# Patient Record
Sex: FEMALE | Race: WHITE | Employment: UNEMPLOYED | URBAN - METROPOLITAN AREA
[De-identification: names, ages, dates, MRNs, and addresses within clinical notes are randomized per-mention and may not be internally consistent; named-entity substitution may affect disease eponyms.]

---

## 2017-01-28 ENCOUNTER — OFFICE VISIT (OUTPATIENT)
Dept: FAMILY MEDICINE CLINIC | Facility: CLINIC | Age: 42
End: 2017-01-28

## 2017-01-28 VITALS
DIASTOLIC BLOOD PRESSURE: 82 MMHG | WEIGHT: 293 LBS | BODY MASS INDEX: 61 KG/M2 | TEMPERATURE: 98 F | SYSTOLIC BLOOD PRESSURE: 126 MMHG | HEART RATE: 76 BPM

## 2017-01-28 DIAGNOSIS — J31.0 RHINITIS, UNSPECIFIED TYPE: Primary | ICD-10-CM

## 2017-01-28 PROCEDURE — 99213 OFFICE O/P EST LOW 20 MIN: CPT | Performed by: FAMILY MEDICINE

## 2017-01-28 PROCEDURE — 99212 OFFICE O/P EST SF 10 MIN: CPT | Performed by: FAMILY MEDICINE

## 2017-01-28 NOTE — PROGRESS NOTES
HPI:    Patient ID: Elisa Shabazz is a 39year old female. Patient presents with:  Ear Problem  Sneezing  Runny Nose  Sore Throat    HPI  Pt's 4 yo son was just in earlier with pharyngitis. Pt decided to get seen, too, due to above sxs. Afebrile now.    Sx encounter    Imaging & Referrals:  None         ID#[id number]

## 2017-01-28 NOTE — PATIENT INSTRUCTIONS
Sudafed , over the counter but you need to go to the pharmacy counter    30 mg tablets , 2-3 x a day, don't take last dose near bed time. For runny, drippy nose.

## 2017-03-11 ENCOUNTER — OFFICE VISIT (OUTPATIENT)
Dept: FAMILY MEDICINE CLINIC | Facility: CLINIC | Age: 42
End: 2017-03-11

## 2017-03-11 VITALS
BODY MASS INDEX: 53.92 KG/M2 | RESPIRATION RATE: 18 BRPM | DIASTOLIC BLOOD PRESSURE: 89 MMHG | HEART RATE: 72 BPM | SYSTOLIC BLOOD PRESSURE: 162 MMHG | TEMPERATURE: 98 F | WEIGHT: 293 LBS | HEIGHT: 62 IN

## 2017-03-11 DIAGNOSIS — I10 ESSENTIAL HYPERTENSION: Primary | ICD-10-CM

## 2017-03-11 PROCEDURE — 99213 OFFICE O/P EST LOW 20 MIN: CPT | Performed by: FAMILY MEDICINE

## 2017-03-11 PROCEDURE — 99212 OFFICE O/P EST SF 10 MIN: CPT | Performed by: FAMILY MEDICINE

## 2017-03-11 RX ORDER — LISINOPRIL 10 MG/1
10 TABLET ORAL DAILY
Qty: 90 TABLET | Refills: 3 | Status: SHIPPED | OUTPATIENT
Start: 2017-03-11 | End: 2017-12-30

## 2017-03-11 NOTE — PROGRESS NOTES
HPI:    Patient ID: Jackeline Lozano is a 39year old female. HPI Comments: Patient is here for follow up for chronic medical issues- HTN. The patient has not been on medications recently.  There are no acute issues and patient is requesting refills on her

## 2017-03-29 ENCOUNTER — OFFICE VISIT (OUTPATIENT)
Dept: FAMILY MEDICINE CLINIC | Facility: CLINIC | Age: 42
End: 2017-03-29

## 2017-03-29 VITALS
DIASTOLIC BLOOD PRESSURE: 90 MMHG | BODY MASS INDEX: 60 KG/M2 | WEIGHT: 293 LBS | HEART RATE: 69 BPM | SYSTOLIC BLOOD PRESSURE: 137 MMHG | TEMPERATURE: 98 F

## 2017-03-29 DIAGNOSIS — K52.9 GASTROENTERITIS: Primary | ICD-10-CM

## 2017-03-29 PROCEDURE — 99213 OFFICE O/P EST LOW 20 MIN: CPT | Performed by: PHYSICIAN ASSISTANT

## 2017-03-29 PROCEDURE — 99212 OFFICE O/P EST SF 10 MIN: CPT | Performed by: PHYSICIAN ASSISTANT

## 2017-03-29 NOTE — PROGRESS NOTES
HPI:    Patient ID: Mike Aponte is a 39year old female. HPI Comments: Patient presents for abdominal pain, vomiting, and diarrhea for past four days. She states vomiting and diarrhea have resolved but still feels weak and tired today.   She had lower RTC if symptoms are persisting or present to ER with severe abdominal pain or if unable to tolerate PO. No orders of the defined types were placed in this encounter.        Meds This Visit:  No prescriptions requested or ordered in this encounter    Im

## 2017-12-19 ENCOUNTER — TELEPHONE (OUTPATIENT)
Dept: FAMILY MEDICINE CLINIC | Facility: CLINIC | Age: 42
End: 2017-12-19

## 2017-12-19 DIAGNOSIS — Z12.31 VISIT FOR SCREENING MAMMOGRAM: Primary | ICD-10-CM

## 2017-12-19 NOTE — TELEPHONE ENCOUNTER
See 16 mammogram order- will  before pt can be scheduled    Central Scheduling  requesting new order

## 2017-12-19 NOTE — TELEPHONE ENCOUNTER
Can contact pt and see if this is just routine screening or is there a lump or something that needs to be checked. Chart reviewed and not clear if any previous issue. Can make it routine screening mammogram if no hx of issues or current symptoms.

## 2017-12-19 NOTE — TELEPHONE ENCOUNTER
Contacted patient and informed order has been re entered, informed of Dr. Hilda Doran message below. Patient verbalized understanding, requested to be transferred to central scheduling to schedule annual px.

## 2017-12-19 NOTE — TELEPHONE ENCOUNTER
Melba/[facility] [phone] states that she has a question regarding a the new order. Does the doctor want a diagnostic or screening? Please, call Melba to further discuss.

## 2017-12-19 NOTE — TELEPHONE ENCOUNTER
Detailed voicemail left on Melba ERWIN informing order for patient has been placed. To call office with any questions.

## 2017-12-19 NOTE — TELEPHONE ENCOUNTER
Dr. Tracee Troy please see message below, mammogram order pended. Pleas review and make changes necessary prior to signing.

## 2017-12-20 NOTE — TELEPHONE ENCOUNTER
Called and spoke w/ Pt. She said new order was only needed if she was going to be scheduled  or after, since order would then be . Pt was able to get appointment for this  so she can still use order from last year.  Also it is just h

## 2017-12-30 ENCOUNTER — OFFICE VISIT (OUTPATIENT)
Dept: FAMILY MEDICINE CLINIC | Facility: CLINIC | Age: 42
End: 2017-12-30

## 2017-12-30 VITALS
WEIGHT: 293 LBS | DIASTOLIC BLOOD PRESSURE: 91 MMHG | SYSTOLIC BLOOD PRESSURE: 142 MMHG | TEMPERATURE: 98 F | HEIGHT: 62.5 IN | BODY MASS INDEX: 52.57 KG/M2 | HEART RATE: 73 BPM

## 2017-12-30 DIAGNOSIS — J06.9 ACUTE UPPER RESPIRATORY INFECTION: Primary | ICD-10-CM

## 2017-12-30 DIAGNOSIS — I10 ESSENTIAL HYPERTENSION: ICD-10-CM

## 2017-12-30 DIAGNOSIS — Z12.39 SCREENING FOR BREAST CANCER: ICD-10-CM

## 2017-12-30 PROBLEM — K52.9 GASTROENTERITIS: Status: RESOLVED | Noted: 2017-03-29 | Resolved: 2017-12-30

## 2017-12-30 PROCEDURE — 99212 OFFICE O/P EST SF 10 MIN: CPT | Performed by: PHYSICIAN ASSISTANT

## 2017-12-30 PROCEDURE — 99213 OFFICE O/P EST LOW 20 MIN: CPT | Performed by: PHYSICIAN ASSISTANT

## 2017-12-30 RX ORDER — LISINOPRIL 10 MG/1
10 TABLET ORAL DAILY
Qty: 90 TABLET | Refills: 3 | Status: SHIPPED | OUTPATIENT
Start: 2017-12-30 | End: 2018-02-07

## 2017-12-30 RX ORDER — BENZONATATE 100 MG/1
100 CAPSULE ORAL 3 TIMES DAILY PRN
Qty: 30 CAPSULE | Refills: 0 | Status: SHIPPED | OUTPATIENT
Start: 2017-12-30 | End: 2018-02-02 | Stop reason: ALTCHOICE

## 2017-12-30 RX ORDER — AZITHROMYCIN 250 MG/1
TABLET, FILM COATED ORAL
Qty: 6 TABLET | Refills: 0 | Status: SHIPPED | OUTPATIENT
Start: 2017-12-30 | End: 2018-02-02 | Stop reason: ALTCHOICE

## 2017-12-30 NOTE — PROGRESS NOTES
HPI:    Patient ID: Flakita Gustafson is a 43year old female. Patient presents for cough and chest congestion for past one week. She states has had symptoms on and off for past couple months and family keeps passing it around.   She denies fever or chill supple. Cardiovascular: Normal rate, regular rhythm and normal heart sounds. Pulmonary/Chest: Effort normal and breath sounds normal. No respiratory distress. She has no wheezes. She has no rales. Lymphadenopathy:     She has no cervical adenopathy.

## 2018-02-01 ENCOUNTER — NURSE TRIAGE (OUTPATIENT)
Dept: OTHER | Age: 43
End: 2018-02-01

## 2018-02-02 ENCOUNTER — OFFICE VISIT (OUTPATIENT)
Dept: FAMILY MEDICINE CLINIC | Facility: CLINIC | Age: 43
End: 2018-02-02

## 2018-02-02 VITALS
TEMPERATURE: 98 F | WEIGHT: 293 LBS | BODY MASS INDEX: 60 KG/M2 | SYSTOLIC BLOOD PRESSURE: 172 MMHG | HEART RATE: 76 BPM | DIASTOLIC BLOOD PRESSURE: 78 MMHG

## 2018-02-02 DIAGNOSIS — M79.642 HAND PAIN, LEFT: Primary | ICD-10-CM

## 2018-02-02 PROCEDURE — 99214 OFFICE O/P EST MOD 30 MIN: CPT | Performed by: FAMILY MEDICINE

## 2018-02-02 PROCEDURE — 97810 ACUP 1/> WO ESTIM 1ST 15 MIN: CPT | Performed by: FAMILY MEDICINE

## 2018-02-02 PROCEDURE — 99212 OFFICE O/P EST SF 10 MIN: CPT | Performed by: FAMILY MEDICINE

## 2018-02-02 NOTE — TELEPHONE ENCOUNTER
Action Requested: Summary for Provider     []  Critical Lab, Recommendations Needed  [] Need Additional Advice  []   FYI    []   Need Orders  [] Need Medications Sent to Pharmacy  []  Other     SUMMARY:Appointment made with Dr Brian Barraza on 2/2/18 at [phone] AM

## 2018-02-02 NOTE — PROGRESS NOTES
HPI:    Patient ID: Jay Villaseñor is a 43year old female. Started having pain in the thumb few days ago, hit her thumb really hard. Then next day went to work with  and started having pain in the left forearm and left hand.  Still hu

## 2018-02-06 ENCOUNTER — OFFICE VISIT (OUTPATIENT)
Dept: FAMILY MEDICINE CLINIC | Facility: CLINIC | Age: 43
End: 2018-02-06

## 2018-02-06 VITALS
WEIGHT: 293 LBS | HEART RATE: 69 BPM | TEMPERATURE: 98 F | SYSTOLIC BLOOD PRESSURE: 149 MMHG | DIASTOLIC BLOOD PRESSURE: 99 MMHG | BODY MASS INDEX: 60 KG/M2

## 2018-02-06 DIAGNOSIS — M79.642 HAND PAIN, LEFT: Primary | ICD-10-CM

## 2018-02-06 PROCEDURE — 99212 OFFICE O/P EST SF 10 MIN: CPT | Performed by: FAMILY MEDICINE

## 2018-02-06 PROCEDURE — 99213 OFFICE O/P EST LOW 20 MIN: CPT | Performed by: FAMILY MEDICINE

## 2018-02-06 NOTE — PROGRESS NOTES
HPI:    Patient ID: Sergio Mayers is a 43year old female. Patient doing much better . Almost no pain since she did acupuncture. Review of Systems   Constitutional: Negative. Respiratory: Negative. Cardiovascular: Negative.     Musculoskel

## 2018-02-07 ENCOUNTER — NURSE TRIAGE (OUTPATIENT)
Dept: OTHER | Age: 43
End: 2018-02-07

## 2018-02-07 ENCOUNTER — OFFICE VISIT (OUTPATIENT)
Dept: FAMILY MEDICINE CLINIC | Facility: CLINIC | Age: 43
End: 2018-02-07

## 2018-02-07 VITALS
HEART RATE: 77 BPM | TEMPERATURE: 98 F | DIASTOLIC BLOOD PRESSURE: 79 MMHG | HEIGHT: 62.5 IN | BODY MASS INDEX: 52.57 KG/M2 | SYSTOLIC BLOOD PRESSURE: 152 MMHG | WEIGHT: 293 LBS

## 2018-02-07 DIAGNOSIS — I10 ESSENTIAL HYPERTENSION: Primary | ICD-10-CM

## 2018-02-07 DIAGNOSIS — F41.9 ANXIETY: ICD-10-CM

## 2018-02-07 PROCEDURE — 99212 OFFICE O/P EST SF 10 MIN: CPT | Performed by: FAMILY MEDICINE

## 2018-02-07 PROCEDURE — 99214 OFFICE O/P EST MOD 30 MIN: CPT | Performed by: FAMILY MEDICINE

## 2018-02-07 RX ORDER — LISINOPRIL AND HYDROCHLOROTHIAZIDE 12.5; 1 MG/1; MG/1
1 TABLET ORAL DAILY
Qty: 90 TABLET | Refills: 1 | Status: SHIPPED | OUTPATIENT
Start: 2018-02-07 | End: 2018-03-07

## 2018-02-07 NOTE — TELEPHONE ENCOUNTER
Action Requested: Summary for Provider     []  Critical Lab, Recommendations Needed  [] Need Additional Advice  []   FYI    []   Need Orders  [] Need Medications Sent to Pharmacy  []  Other     SUMMARY: Pt will go to office now.  Ok per TSB for pt to be see

## 2018-02-13 NOTE — PROGRESS NOTES
HPI:    Patient ID: Dev Jaffe is a 43year old female. Patient has a lot of stress lately. This am and at times has elevation of blood pressure and was worried. SBP in 160s. Had some headache associated with that also. Work is very stressful. daily.           Imaging & Referrals:  None       QB#[id number]

## 2018-02-15 ENCOUNTER — TELEPHONE (OUTPATIENT)
Dept: FAMILY MEDICINE CLINIC | Facility: CLINIC | Age: 43
End: 2018-02-15

## 2018-02-15 NOTE — TELEPHONE ENCOUNTER
FMLA form for Dr. Frank Corral received in [employer] Signed release, pt paid with . Logged for processing.  NK

## 2018-02-15 NOTE — TELEPHONE ENCOUNTER
FMLA forms. She needs to pick them up and not have them faxed according to her papers.  She will pick them up at [facility]

## 2018-02-21 ENCOUNTER — OFFICE VISIT (OUTPATIENT)
Dept: FAMILY MEDICINE CLINIC | Facility: CLINIC | Age: 43
End: 2018-02-21

## 2018-02-21 VITALS
SYSTOLIC BLOOD PRESSURE: 164 MMHG | BODY MASS INDEX: 59 KG/M2 | DIASTOLIC BLOOD PRESSURE: 80 MMHG | HEART RATE: 80 BPM | WEIGHT: 293 LBS

## 2018-02-21 DIAGNOSIS — F41.9 ANXIETY: ICD-10-CM

## 2018-02-21 DIAGNOSIS — I10 ESSENTIAL HYPERTENSION: Primary | ICD-10-CM

## 2018-02-21 PROCEDURE — 99212 OFFICE O/P EST SF 10 MIN: CPT | Performed by: FAMILY MEDICINE

## 2018-02-21 PROCEDURE — 99214 OFFICE O/P EST MOD 30 MIN: CPT | Performed by: FAMILY MEDICINE

## 2018-02-21 RX ORDER — ESCITALOPRAM OXALATE 10 MG/1
TABLET ORAL
Qty: 90 TABLET | Refills: 0 | Status: SHIPPED | OUTPATIENT
Start: 2018-02-21 | End: 2018-04-04

## 2018-02-21 NOTE — PROGRESS NOTES
HPI:    Patient ID: Lam Francisco is a 43year old female. Hypertension not controlled. Taking only hctz 12.5 mg po qd/. Anxious. Not working now., due to anxiety. Dizziness better        Review of Systems   Constitutional: Negative.   Negative f

## 2018-02-21 NOTE — TELEPHONE ENCOUNTER
Good afternoon Dr. Chris Cao pending in [employer]. Pt is requesting a continuous leave of 1-10 weeks starting from 2/7/18 due to Anxiety and High Blood Pressure. Do you approve? Please advise.     Thank you,  [facility]

## 2018-02-22 NOTE — TELEPHONE ENCOUNTER
Dr. Jonatan Sneed,    Please sign off on form:  -Highlight the patient and hit \"Chart\" button. -In Chart Review, w/in the Encounter tab - click 1 time on the Telephone call encounter for 2/15/18.  Scroll down the telephone encounter.  -Click \"scan on\" blue Hy

## 2018-02-26 NOTE — TELEPHONE ENCOUNTER
Pt stopped at [place] office to  [place] paperwork and have FMLA faxed by [employer] Khan but is also requesting an off work note that she is under Dr. Jose bergeron starting from 2/7/18 until further notice. She needs this note by Tues 2/27/18.  Please

## 2018-03-06 ENCOUNTER — TELEPHONE (OUTPATIENT)
Dept: FAMILY MEDICINE CLINIC | Facility: CLINIC | Age: 43
End: 2018-03-06

## 2018-03-07 NOTE — PROGRESS NOTES
HPI:    Patient ID: Rachael Kay is a 43year old female. Anxiety and blood pressure much better. No side effectsbp still high        Review of Systems   Constitutional: Negative. Negative for activity change, diaphoresis and fatigue.    Respiratory:

## 2018-04-04 ENCOUNTER — OFFICE VISIT (OUTPATIENT)
Dept: FAMILY MEDICINE CLINIC | Facility: CLINIC | Age: 43
End: 2018-04-04

## 2018-04-04 VITALS
HEART RATE: 70 BPM | WEIGHT: 293 LBS | DIASTOLIC BLOOD PRESSURE: 78 MMHG | SYSTOLIC BLOOD PRESSURE: 136 MMHG | BODY MASS INDEX: 59 KG/M2

## 2018-04-04 DIAGNOSIS — I10 ESSENTIAL HYPERTENSION: Primary | ICD-10-CM

## 2018-04-04 DIAGNOSIS — F41.9 ANXIETY: ICD-10-CM

## 2018-04-04 PROCEDURE — 99212 OFFICE O/P EST SF 10 MIN: CPT | Performed by: FAMILY MEDICINE

## 2018-04-04 PROCEDURE — 99214 OFFICE O/P EST MOD 30 MIN: CPT | Performed by: FAMILY MEDICINE

## 2018-04-04 RX ORDER — LISINOPRIL AND HYDROCHLOROTHIAZIDE 25; 20 MG/1; MG/1
1 TABLET ORAL DAILY
Qty: 30 TABLET | Refills: 2 | Status: SHIPPED | OUTPATIENT
Start: 2018-04-04 | End: 2018-11-14

## 2018-04-04 RX ORDER — ESCITALOPRAM OXALATE 10 MG/1
10 TABLET ORAL DAILY
Qty: 30 TABLET | Refills: 2 | Status: SHIPPED | OUTPATIENT
Start: 2018-04-04 | End: 2018-05-31

## 2018-04-04 NOTE — PROGRESS NOTES
HPI:    Patient ID: Avi Katz is a 43year old female. herefor f/u hypertension and anxiety. Doing well. No side effects with medication. HPI    Review of Systems   Constitutional: Negative.   Negative for appetite change, chills, diaphoresis and

## 2018-05-31 ENCOUNTER — LAB ENCOUNTER (OUTPATIENT)
Dept: LAB | Age: 43
End: 2018-05-31
Attending: FAMILY MEDICINE
Payer: COMMERCIAL

## 2018-05-31 ENCOUNTER — TELEPHONE (OUTPATIENT)
Dept: FAMILY MEDICINE CLINIC | Facility: CLINIC | Age: 43
End: 2018-05-31

## 2018-05-31 ENCOUNTER — OFFICE VISIT (OUTPATIENT)
Dept: FAMILY MEDICINE CLINIC | Facility: CLINIC | Age: 43
End: 2018-05-31

## 2018-05-31 VITALS
HEART RATE: 69 BPM | SYSTOLIC BLOOD PRESSURE: 144 MMHG | TEMPERATURE: 98 F | DIASTOLIC BLOOD PRESSURE: 85 MMHG | HEIGHT: 62.5 IN | WEIGHT: 293 LBS | BODY MASS INDEX: 52.57 KG/M2

## 2018-05-31 DIAGNOSIS — E66.3 OVERWEIGHT ON EXAMINATION: ICD-10-CM

## 2018-05-31 DIAGNOSIS — I10 ESSENTIAL HYPERTENSION: ICD-10-CM

## 2018-05-31 DIAGNOSIS — I10 ESSENTIAL HYPERTENSION: Primary | ICD-10-CM

## 2018-05-31 DIAGNOSIS — F32.A DEPRESSION, UNSPECIFIED DEPRESSION TYPE: ICD-10-CM

## 2018-05-31 PROCEDURE — 85025 COMPLETE CBC W/AUTO DIFF WBC: CPT

## 2018-05-31 PROCEDURE — 36415 COLL VENOUS BLD VENIPUNCTURE: CPT

## 2018-05-31 PROCEDURE — 84443 ASSAY THYROID STIM HORMONE: CPT

## 2018-05-31 PROCEDURE — 99214 OFFICE O/P EST MOD 30 MIN: CPT | Performed by: FAMILY MEDICINE

## 2018-05-31 PROCEDURE — 80053 COMPREHEN METABOLIC PANEL: CPT

## 2018-05-31 PROCEDURE — 85730 THROMBOPLASTIN TIME PARTIAL: CPT

## 2018-05-31 PROCEDURE — 85610 PROTHROMBIN TIME: CPT

## 2018-05-31 PROCEDURE — 99212 OFFICE O/P EST SF 10 MIN: CPT | Performed by: FAMILY MEDICINE

## 2018-05-31 PROCEDURE — 83036 HEMOGLOBIN GLYCOSYLATED A1C: CPT

## 2018-05-31 RX ORDER — ESCITALOPRAM OXALATE 20 MG/1
20 TABLET ORAL DAILY
Qty: 90 TABLET | Refills: 1 | Status: SHIPPED | OUTPATIENT
Start: 2018-05-31 | End: 2018-10-26

## 2018-05-31 NOTE — PROGRESS NOTES
HPI:    Patient ID: Elisa Beavers is a 37year old female. Hypertension controlled at home. Depression getting worse. Review of Systems   Constitutional: Negative for activity change, appetite change, chills, diaphoresis and fatigue.    HEN Placed This Encounter      Comp Metabolic Panel (14) [E]      Hemoglobin A1C [E]      TSH [E]      CBC W Differential W Platelet [E]      Prothrombin Time (PT) [E]      PTT, Activated [E]    Meds This Visit:  Signed Prescriptions Disp Refills    escitalopr

## 2018-06-01 NOTE — TELEPHONE ENCOUNTER
Patient calling office requesting lab results. Informed letter mailed and per Dr. Serene Nice results normal. Patient verbalized understanding denied any further questions at the time of call.

## 2018-06-15 ENCOUNTER — TELEPHONE (OUTPATIENT)
Dept: FAMILY MEDICINE CLINIC | Facility: CLINIC | Age: 43
End: 2018-06-15

## 2018-07-12 ENCOUNTER — OFFICE VISIT (OUTPATIENT)
Dept: FAMILY MEDICINE CLINIC | Facility: CLINIC | Age: 43
End: 2018-07-12

## 2018-07-12 VITALS — TEMPERATURE: 98 F | HEART RATE: 70 BPM | SYSTOLIC BLOOD PRESSURE: 109 MMHG | DIASTOLIC BLOOD PRESSURE: 70 MMHG

## 2018-07-12 DIAGNOSIS — R94.5 ABNORMAL LIVER FUNCTION: ICD-10-CM

## 2018-07-12 DIAGNOSIS — R51.9 NONINTRACTABLE EPISODIC HEADACHE, UNSPECIFIED HEADACHE TYPE: Primary | ICD-10-CM

## 2018-07-12 PROCEDURE — 99212 OFFICE O/P EST SF 10 MIN: CPT | Performed by: FAMILY MEDICINE

## 2018-07-12 PROCEDURE — 99213 OFFICE O/P EST LOW 20 MIN: CPT | Performed by: FAMILY MEDICINE

## 2018-07-12 NOTE — PROGRESS NOTES
HPI:    Patient ID: Vania Ruggiero is a 37year old female. Sometimes with headache has pressure in the head that feels that will explode. Also has dizziness on and off. Head feels spinning.          Review of Systems   Constitutional: Positive for fa [phone]#[id number]

## 2018-07-17 ENCOUNTER — HOSPITAL ENCOUNTER (OUTPATIENT)
Dept: CT IMAGING | Facility: HOSPITAL | Age: 43
Discharge: HOME | End: 2018-07-17
Attending: FAMILY MEDICINE
Payer: COMMERCIAL

## 2018-07-17 DIAGNOSIS — R51.9 NONINTRACTABLE EPISODIC HEADACHE, UNSPECIFIED HEADACHE TYPE: ICD-10-CM

## 2018-07-17 PROCEDURE — 70450 CT HEAD/BRAIN W/O DYE: CPT | Performed by: FAMILY MEDICINE

## 2018-08-03 ENCOUNTER — LAB ENCOUNTER (OUTPATIENT)
Dept: LAB | Age: 43
End: 2018-08-03
Attending: FAMILY MEDICINE
Payer: COMMERCIAL

## 2018-08-03 ENCOUNTER — OFFICE VISIT (OUTPATIENT)
Dept: FAMILY MEDICINE CLINIC | Facility: CLINIC | Age: 43
End: 2018-08-03
Payer: COMMERCIAL

## 2018-08-03 VITALS
BODY MASS INDEX: 52.57 KG/M2 | SYSTOLIC BLOOD PRESSURE: 144 MMHG | HEART RATE: 67 BPM | WEIGHT: 293 LBS | HEIGHT: 62.5 IN | TEMPERATURE: 98 F | DIASTOLIC BLOOD PRESSURE: 94 MMHG

## 2018-08-03 DIAGNOSIS — R94.5 ABNORMAL LIVER FUNCTION: ICD-10-CM

## 2018-08-03 DIAGNOSIS — F41.9 ANXIETY: ICD-10-CM

## 2018-08-03 DIAGNOSIS — I10 ESSENTIAL HYPERTENSION: Primary | ICD-10-CM

## 2018-08-03 LAB
ALBUMIN SERPL BCP-MCNC: 3.4 G/DL (ref 3.5–4.8)
ALP SERPL-CCNC: 64 U/L (ref 32–100)
ALT SERPL-CCNC: 67 U/L (ref 14–54)
AST SERPL-CCNC: 88 U/L (ref 15–41)
BILIRUB DIRECT SERPL-MCNC: 0.1 MG/DL (ref 0–0.2)
BILIRUB SERPL-MCNC: 0.6 MG/DL (ref 0.3–1.2)
IRON SATN MFR SERPL: 25 % (ref 15–50)
IRON SERPL-MCNC: 95 MCG/DL (ref 28–170)
PROT SERPL-MCNC: 7.1 G/DL (ref 5.9–8.4)
TIBC SERPL-MCNC: 374 MCG/DL (ref 228–428)
TRANSFERRIN SERPL-MCNC: 283 MG/DL (ref 192–382)

## 2018-08-03 PROCEDURE — 87340 HEPATITIS B SURFACE AG IA: CPT

## 2018-08-03 PROCEDURE — 86706 HEP B SURFACE ANTIBODY: CPT

## 2018-08-03 PROCEDURE — 36415 COLL VENOUS BLD VENIPUNCTURE: CPT

## 2018-08-03 PROCEDURE — 82390 ASSAY OF CERULOPLASMIN: CPT

## 2018-08-03 PROCEDURE — 80076 HEPATIC FUNCTION PANEL: CPT

## 2018-08-03 PROCEDURE — 84466 ASSAY OF TRANSFERRIN: CPT

## 2018-08-03 PROCEDURE — 99214 OFFICE O/P EST MOD 30 MIN: CPT | Performed by: FAMILY MEDICINE

## 2018-08-03 PROCEDURE — 86803 HEPATITIS C AB TEST: CPT

## 2018-08-03 PROCEDURE — 86709 HEPATITIS A IGM ANTIBODY: CPT

## 2018-08-03 PROCEDURE — 86708 HEPATITIS A ANTIBODY: CPT

## 2018-08-03 PROCEDURE — 83540 ASSAY OF IRON: CPT

## 2018-08-03 PROCEDURE — 80500 HEPATITIS A B + C PROFILE: CPT

## 2018-08-03 PROCEDURE — 99212 OFFICE O/P EST SF 10 MIN: CPT | Performed by: FAMILY MEDICINE

## 2018-08-03 PROCEDURE — 86704 HEP B CORE ANTIBODY TOTAL: CPT

## 2018-08-03 NOTE — PROGRESS NOTES
HPI:    Patient ID: Moncho Partida is a 37year old female. ANXIOUS, COULD NOT WORK FOR 6 WEEKS,   Took time off . Feeling better now. Needs note for work  bp better at home. Also to discuss elevated LFTs.          Review of Systems   Constitutional:

## 2018-08-04 LAB — CERULOPLASMIN: 27 MG/DL

## 2018-08-06 ENCOUNTER — TELEPHONE (OUTPATIENT)
Dept: OTHER | Age: 43
End: 2018-08-06

## 2018-08-06 ENCOUNTER — TELEPHONE (OUTPATIENT)
Dept: FAMILY MEDICINE CLINIC | Facility: CLINIC | Age: 43
End: 2018-08-06

## 2018-08-06 DIAGNOSIS — R74.8 ABNORMAL LIVER ENZYMES: Primary | ICD-10-CM

## 2018-08-06 LAB
HAV AB SER QL IA: REACTIVE
HAV IGM SERPL QL IA: NONREACTIVE
HBV CORE AB SERPL QL IA: NONREACTIVE
HBV SURFACE AB SER-ACNC: <3.1 MIU/ML (ref ?–10)
HBV SURFACE AG SERPL QL IA: NONREACTIVE
HBV SURFACE AG SERPL QL IA: NONREACTIVE
HCV AB SERPL QL IA: NONREACTIVE

## 2018-08-06 NOTE — TELEPHONE ENCOUNTER
Advised patient on Dr. Jose Wilson information and recommendation. Patient verbalized understanding. Gave patient the phone # to schedule. Advised to call back if needed.       Notes recorded by August De Los Santos MD on 8/6/2018 at 12:26 PM CDT  Continues having

## 2018-08-09 ENCOUNTER — HOSPITAL ENCOUNTER (OUTPATIENT)
Dept: ULTRASOUND IMAGING | Age: 43
Discharge: HOME | End: 2018-08-09
Attending: FAMILY MEDICINE
Payer: COMMERCIAL

## 2018-08-09 DIAGNOSIS — R74.8 ABNORMAL LIVER ENZYMES: ICD-10-CM

## 2018-08-09 PROCEDURE — 76705 ECHO EXAM OF ABDOMEN: CPT | Performed by: FAMILY MEDICINE

## 2018-08-10 ENCOUNTER — TELEPHONE (OUTPATIENT)
Dept: OTHER | Age: 43
End: 2018-08-10

## 2018-08-10 NOTE — TELEPHONE ENCOUNTER
Spoke with patient and informed her of her results from below. Patient was provided with the contact number to GI. Patient voiced understanding and agreed with the plan of care.      Result Notes     Notes recorded by Edgardo Cervantes MD on 8/9/2018 at 11:55

## 2018-08-28 ENCOUNTER — APPOINTMENT (OUTPATIENT)
Dept: GENERAL RADIOLOGY | Facility: HOSPITAL | Age: 43
End: 2018-08-28
Attending: EMERGENCY MEDICINE
Payer: COMMERCIAL

## 2018-08-28 ENCOUNTER — APPOINTMENT (OUTPATIENT)
Dept: CT IMAGING | Facility: HOSPITAL | Age: 43
End: 2018-08-28
Attending: EMERGENCY MEDICINE
Payer: COMMERCIAL

## 2018-08-28 ENCOUNTER — HOSPITAL ENCOUNTER (EMERGENCY)
Facility: HOSPITAL | Age: 43
Discharge: HOME | End: 2018-08-28
Attending: EMERGENCY MEDICINE
Payer: COMMERCIAL

## 2018-08-28 ENCOUNTER — APPOINTMENT (OUTPATIENT)
Dept: NUCLEAR MEDICINE | Facility: HOSPITAL | Age: 43
End: 2018-08-28
Attending: EMERGENCY MEDICINE
Payer: COMMERCIAL

## 2018-08-28 ENCOUNTER — APPOINTMENT (OUTPATIENT)
Dept: CV DIAGNOSTICS | Facility: HOSPITAL | Age: 43
End: 2018-08-28
Attending: EMERGENCY MEDICINE
Payer: COMMERCIAL

## 2018-08-28 VITALS
TEMPERATURE: 98 F | WEIGHT: 293 LBS | DIASTOLIC BLOOD PRESSURE: 76 MMHG | HEART RATE: 72 BPM | HEIGHT: 62 IN | SYSTOLIC BLOOD PRESSURE: 131 MMHG | BODY MASS INDEX: 53.92 KG/M2 | RESPIRATION RATE: 15 BRPM | OXYGEN SATURATION: 99 %

## 2018-08-28 DIAGNOSIS — R06.00 DYSPNEA, UNSPECIFIED TYPE: Primary | ICD-10-CM

## 2018-08-28 LAB
ANION GAP SERPL CALC-SCNC: 6 MMOL/L (ref 0–18)
B-HCG UR QL: NEGATIVE
BASOPHILS # BLD: 0.1 K/UL (ref 0–0.2)
BASOPHILS NFR BLD: 1 %
BNP SERPL-MCNC: 20 PG/ML (ref 0–100)
BUN SERPL-MCNC: 11 MG/DL (ref 8–20)
BUN/CREAT SERPL: 16.7 (ref 10–20)
CALCIUM SERPL-MCNC: 9.4 MG/DL (ref 8.5–10.5)
CHLORIDE SERPL-SCNC: 104 MMOL/L (ref 95–110)
CO2 SERPL-SCNC: 28 MMOL/L (ref 22–32)
CREAT SERPL-MCNC: 0.66 MG/DL (ref 0.5–1.5)
EOSINOPHIL # BLD: 0.2 K/UL (ref 0–0.7)
EOSINOPHIL NFR BLD: 3 %
ERYTHROCYTE [DISTWIDTH] IN BLOOD BY AUTOMATED COUNT: 12.7 % (ref 11–15)
GLUCOSE SERPL-MCNC: 101 MG/DL (ref 70–99)
HCT VFR BLD AUTO: 40.5 % (ref 35–48)
HGB BLD-MCNC: 13.7 G/DL (ref 12–16)
LYMPHOCYTES # BLD: 2.3 K/UL (ref 1–4)
LYMPHOCYTES NFR BLD: 33 %
MCH RBC QN AUTO: 30.1 PG (ref 27–32)
MCHC RBC AUTO-ENTMCNC: 33.9 G/DL (ref 32–37)
MCV RBC AUTO: 88.8 FL (ref 80–100)
MONOCYTES # BLD: 0.5 K/UL (ref 0–1)
MONOCYTES NFR BLD: 6 %
NEUTROPHILS # BLD AUTO: 4 K/UL (ref 1.8–7.7)
NEUTROPHILS NFR BLD: 57 %
OSMOLALITY UR CALC.SUM OF ELEC: 286 MOSM/KG (ref 275–295)
PLATELET # BLD AUTO: 164 K/UL (ref 140–400)
PMV BLD AUTO: 9.5 FL (ref 7.4–10.3)
POTASSIUM SERPL-SCNC: 3.8 MMOL/L (ref 3.3–5.1)
RBC # BLD AUTO: 4.57 M/UL (ref 3.7–5.4)
SODIUM SERPL-SCNC: 138 MMOL/L (ref 136–144)
TROPONIN I SERPL-MCNC: 0 NG/ML (ref ?–0.03)
WBC # BLD AUTO: 7 K/UL (ref 4–11)

## 2018-08-28 PROCEDURE — 81025 URINE PREGNANCY TEST: CPT

## 2018-08-28 PROCEDURE — 93016 CV STRESS TEST SUPVJ ONLY: CPT | Performed by: EMERGENCY MEDICINE

## 2018-08-28 PROCEDURE — 96361 HYDRATE IV INFUSION ADD-ON: CPT

## 2018-08-28 PROCEDURE — 93018 CV STRESS TEST I&R ONLY: CPT | Performed by: EMERGENCY MEDICINE

## 2018-08-28 PROCEDURE — 99285 EMERGENCY DEPT VISIT HI MDM: CPT

## 2018-08-28 PROCEDURE — 78452 HT MUSCLE IMAGE SPECT MULT: CPT | Performed by: EMERGENCY MEDICINE

## 2018-08-28 PROCEDURE — 85025 COMPLETE CBC W/AUTO DIFF WBC: CPT | Performed by: EMERGENCY MEDICINE

## 2018-08-28 PROCEDURE — 93005 ELECTROCARDIOGRAM TRACING: CPT

## 2018-08-28 PROCEDURE — 80048 BASIC METABOLIC PNL TOTAL CA: CPT | Performed by: EMERGENCY MEDICINE

## 2018-08-28 PROCEDURE — 93017 CV STRESS TEST TRACING ONLY: CPT | Performed by: EMERGENCY MEDICINE

## 2018-08-28 PROCEDURE — 96374 THER/PROPH/DIAG INJ IV PUSH: CPT

## 2018-08-28 PROCEDURE — 71046 X-RAY EXAM CHEST 2 VIEWS: CPT | Performed by: EMERGENCY MEDICINE

## 2018-08-28 PROCEDURE — 71260 CT THORAX DX C+: CPT | Performed by: EMERGENCY MEDICINE

## 2018-08-28 PROCEDURE — 96375 TX/PRO/DX INJ NEW DRUG ADDON: CPT

## 2018-08-28 PROCEDURE — 93010 ELECTROCARDIOGRAM REPORT: CPT | Performed by: EMERGENCY MEDICINE

## 2018-08-28 PROCEDURE — 84484 ASSAY OF TROPONIN QUANT: CPT | Performed by: EMERGENCY MEDICINE

## 2018-08-28 PROCEDURE — 83880 ASSAY OF NATRIURETIC PEPTIDE: CPT | Performed by: EMERGENCY MEDICINE

## 2018-08-28 RX ORDER — SODIUM CHLORIDE 9 MG/ML
INJECTION, SOLUTION INTRAVENOUS
Status: COMPLETED
Start: 2018-08-28 | End: 2018-08-28

## 2018-08-28 RX ORDER — METHYLPREDNISOLONE SODIUM SUCCINATE 40 MG/ML
40 INJECTION, POWDER, LYOPHILIZED, FOR SOLUTION INTRAMUSCULAR; INTRAVENOUS ONCE
Status: COMPLETED | OUTPATIENT
Start: 2018-08-28 | End: 2018-08-28

## 2018-08-28 RX ORDER — DIPHENHYDRAMINE HYDROCHLORIDE 50 MG/ML
25 INJECTION INTRAMUSCULAR; INTRAVENOUS ONCE
Status: COMPLETED | OUTPATIENT
Start: 2018-08-28 | End: 2018-08-28

## 2018-08-28 NOTE — ED INITIAL ASSESSMENT (HPI)
Pt states that she has had increased difficulty breathing x 2 days. Pt states she felt like \"pins were stabbing her in the chest and under her left breast yesterday\", but denies this pain today. Breathing normal and unlabored.

## 2018-08-29 ENCOUNTER — OFFICE VISIT (OUTPATIENT)
Dept: FAMILY MEDICINE CLINIC | Facility: CLINIC | Age: 43
End: 2018-08-29
Payer: COMMERCIAL

## 2018-08-29 VITALS
BODY MASS INDEX: 59 KG/M2 | HEART RATE: 66 BPM | SYSTOLIC BLOOD PRESSURE: 129 MMHG | DIASTOLIC BLOOD PRESSURE: 79 MMHG | WEIGHT: 293 LBS

## 2018-08-29 DIAGNOSIS — R07.89 CHEST DISCOMFORT: Primary | ICD-10-CM

## 2018-08-29 DIAGNOSIS — F41.9 ANXIETY: ICD-10-CM

## 2018-08-29 PROCEDURE — 99212 OFFICE O/P EST SF 10 MIN: CPT | Performed by: FAMILY MEDICINE

## 2018-08-29 PROCEDURE — 99214 OFFICE O/P EST MOD 30 MIN: CPT | Performed by: FAMILY MEDICINE

## 2018-08-29 NOTE — PROGRESS NOTES
HPI:    Patient ID: Mallory Goel is a 37year old female. Felt that her chest is crushing. All the tests were normal.   Stress test was normal... Yesterday also had allergic reaction yesterday from the test.  No back pain. ..   Sweating a lot at nig encounter    Imaging & Referrals:  None       #[id number]

## 2018-09-11 ENCOUNTER — OFFICE VISIT (OUTPATIENT)
Dept: GASTROENTEROLOGY | Facility: CLINIC | Age: 43
End: 2018-09-11
Payer: COMMERCIAL

## 2018-09-11 ENCOUNTER — OFFICE VISIT (OUTPATIENT)
Dept: SURGERY | Facility: CLINIC | Age: 43
End: 2018-09-11
Payer: COMMERCIAL

## 2018-09-11 ENCOUNTER — APPOINTMENT (OUTPATIENT)
Dept: LAB | Age: 43
End: 2018-09-11
Attending: INTERNAL MEDICINE
Payer: COMMERCIAL

## 2018-09-11 VITALS
HEART RATE: 64 BPM | WEIGHT: 293 LBS | OXYGEN SATURATION: 96 % | HEIGHT: 61 IN | RESPIRATION RATE: 18 BRPM | BODY MASS INDEX: 55.32 KG/M2 | DIASTOLIC BLOOD PRESSURE: 90 MMHG | SYSTOLIC BLOOD PRESSURE: 132 MMHG

## 2018-09-11 VITALS
WEIGHT: 293 LBS | SYSTOLIC BLOOD PRESSURE: 132 MMHG | DIASTOLIC BLOOD PRESSURE: 90 MMHG | RESPIRATION RATE: 17 BRPM | TEMPERATURE: 98 F | HEART RATE: 78 BPM | HEIGHT: 61 IN | BODY MASS INDEX: 55.32 KG/M2

## 2018-09-11 DIAGNOSIS — E66.01 MORBID OBESITY WITH BMI OF 60.0-69.9, ADULT (HCC): ICD-10-CM

## 2018-09-11 DIAGNOSIS — I10 ESSENTIAL HYPERTENSION: Primary | ICD-10-CM

## 2018-09-11 DIAGNOSIS — R60.0 LOWER EXTREMITY EDEMA: ICD-10-CM

## 2018-09-11 DIAGNOSIS — Z51.81 ENCOUNTER FOR THERAPEUTIC DRUG MONITORING: ICD-10-CM

## 2018-09-11 DIAGNOSIS — R74.8 ELEVATED LIVER ENZYMES: ICD-10-CM

## 2018-09-11 DIAGNOSIS — IMO0001 CLASS 3 OBESITY WITH SERIOUS COMORBIDITY AND BODY MASS INDEX (BMI) OF 60.0 TO 69.9 IN ADULT, UNSPECIFIED OBESITY TYPE: ICD-10-CM

## 2018-09-11 DIAGNOSIS — R74.8 ELEVATED LIVER ENZYMES: Primary | ICD-10-CM

## 2018-09-11 PROCEDURE — 99212 OFFICE O/P EST SF 10 MIN: CPT | Performed by: INTERNAL MEDICINE

## 2018-09-11 PROCEDURE — 86256 FLUORESCENT ANTIBODY TITER: CPT

## 2018-09-11 PROCEDURE — 99243 OFF/OP CNSLTJ NEW/EST LOW 30: CPT | Performed by: INTERNAL MEDICINE

## 2018-09-11 PROCEDURE — 99214 OFFICE O/P EST MOD 30 MIN: CPT | Performed by: INTERNAL MEDICINE

## 2018-09-11 PROCEDURE — 86255 FLUORESCENT ANTIBODY SCREEN: CPT

## 2018-09-11 PROCEDURE — 82390 ASSAY OF CERULOPLASMIN: CPT

## 2018-09-11 PROCEDURE — 83516 IMMUNOASSAY NONANTIBODY: CPT

## 2018-09-11 PROCEDURE — 36415 COLL VENOUS BLD VENIPUNCTURE: CPT

## 2018-09-11 NOTE — PATIENT INSTRUCTIONS
1. Get your blood tests to check for other causes of elevated liver tests    2. Work on weight loss    3. Please follow up with Dr. Philip Shelley    4.  We will repeat your ultrasound in 6 months at follow up

## 2018-09-11 NOTE — PATIENT INSTRUCTIONS
Goals for next month:  1. Keep a food log. 2. Drink 48-64 ounces of non-caloric beverages per day. No fruit juices or regular soda. 3. Increase activity-upper body exercises, walk 10 minutes per day.   4. Increase fruit and vegetable servings to 5-6 per d

## 2018-09-11 NOTE — PROGRESS NOTES
[facility] BARIATRIC AND WEIGHT LOSS CLINIC  [address] [place] [id number]  Dept: [phone]  Loc: [phone]     Date:   2016    Patient:  Tiffany Patel  :      1975  MRN:      [id number]    Chief Complaint:  Fabiana Quinn Not on file    Tobacco Use      Smoking status: Former Smoker        Quit date: 2011        Years since quittin.6      Smokeless tobacco: Never Used    Substance and Sexual Activity      Alcohol use: No        Comment: rarely      Drug use:  No walks    ROS:    Constitutional: positive for fatigue  Respiratory: positive for dyspnea on exertion  Cardiovascular: negative  Gastrointestinal: negative  Musculoskeletal:positive for arthralgias and back pain  Neurological: negative  Behavioral/Psych: minutes per day. 4. Increase fruit and vegetable servings to 5-6 per day.       Should come to seminar    Will refer to RD for pre surgical weight loss  Has met with Dr Sharyle Flatness (he recommended Juan en Y)  Will need psych, cards and pulmonary clearance

## 2018-09-11 NOTE — H&P
[facility] - Gastroenterology                                                                                                  Clinic History and Physical daily. Disp: 90 tablet Rfl: 1   Lisinopril-Hydrochlorothiazide 20-25 MG Oral Tab Take 1 tablet by mouth daily.  Disp: 30 tablet Rfl: 2       Allergies:    Radiology Contrast *    HIVES, ITCHING    ROS:   all 10 systems were reviewed and were negative except months. We discussed her most likely fatty liver and management which is weight loss and highly encouraged her to follow up with Dr. April Garcia who she has seen in the past and she would like to do. Discussed there are no FDA approved medications.     Recomm

## 2018-09-12 LAB — TTG IGA SER-ACNC: 0.3 U/ML (ref ?–7)

## 2018-09-13 ENCOUNTER — OFFICE VISIT (OUTPATIENT)
Dept: FAMILY MEDICINE CLINIC | Facility: CLINIC | Age: 43
End: 2018-09-13
Payer: COMMERCIAL

## 2018-09-13 VITALS
WEIGHT: 293 LBS | HEART RATE: 62 BPM | TEMPERATURE: 98 F | HEIGHT: 61 IN | DIASTOLIC BLOOD PRESSURE: 81 MMHG | BODY MASS INDEX: 55.32 KG/M2 | SYSTOLIC BLOOD PRESSURE: 144 MMHG

## 2018-09-13 DIAGNOSIS — G47.30 SLEEP APNEA, UNSPECIFIED TYPE: Primary | ICD-10-CM

## 2018-09-13 LAB — CERULOPLASMIN: 26 MG/DL

## 2018-09-13 PROCEDURE — 99212 OFFICE O/P EST SF 10 MIN: CPT | Performed by: FAMILY MEDICINE

## 2018-09-13 PROCEDURE — 99213 OFFICE O/P EST LOW 20 MIN: CPT | Performed by: FAMILY MEDICINE

## 2018-09-13 NOTE — PROGRESS NOTES
HPI:    Patient ID: Mallory Goel is a 37year old female. Patient was recommended by dr Philip Shelley to have sleep study. Patient feels tired during the day and snores at night. Also was recommended to have bariatric surgery in few months.          Review

## 2018-09-14 LAB
MITOCHONDRIA AB TITR SER: <20 {TITER}
SMOOTH MUSCLE AB TITR SER: <20 {TITER}

## 2018-09-17 ENCOUNTER — TELEPHONE (OUTPATIENT)
Dept: GASTROENTEROLOGY | Facility: CLINIC | Age: 43
End: 2018-09-17

## 2018-09-17 NOTE — TELEPHONE ENCOUNTER
GI Staff:    Please let the patient know her blood tests were normal and unremarkable for other etiologies we looked for to evaluate her elevated liver tests looking for an autoimmune cause, copper problem, and celiac disease    I'd like her to work on JOVANY Diana

## 2018-09-17 NOTE — TELEPHONE ENCOUNTER
Pt contacted and reviewed Dr. Anoop Yoo results message below, she verbalized understanding of all and did not have further questions at this time. States she is already f/u with Dr. Sahra Fiore for weight loss and will more than likely have gastric bypass surgery.

## 2018-09-24 ENCOUNTER — OFFICE VISIT (OUTPATIENT)
Dept: SLEEP CENTER | Age: 43
End: 2018-09-24
Attending: FAMILY MEDICINE
Payer: COMMERCIAL

## 2018-09-24 DIAGNOSIS — G47.30 SLEEP APNEA, UNSPECIFIED TYPE: ICD-10-CM

## 2018-09-24 DIAGNOSIS — Z76.89 SLEEP CONCERN: Primary | ICD-10-CM

## 2018-09-24 PROCEDURE — 95810 POLYSOM 6/> YRS 4/> PARAM: CPT

## 2018-09-25 ENCOUNTER — TELEPHONE (OUTPATIENT)
Dept: SURGERY | Facility: CLINIC | Age: 43
End: 2018-09-25

## 2018-09-25 NOTE — TELEPHONE ENCOUNTER
I read off the documentation that was listed regarding her Bariatric benefits. She understood them but would like to know if she has to wait the 6 months period.

## 2018-09-25 NOTE — TELEPHONE ENCOUNTER
9/24/18 @ 9:17am Spoke to [place] at [facility], [phone], PUY#[phone]. She verified that patient has following benefits for Bariatric services:   • No weight management criteria. • No SHYAM/Blue Distinction required.    • CARL (SYQ#[id number]) DX E66.01

## 2018-09-25 NOTE — TELEPHONE ENCOUNTER
Spoke to patient and reviewed all info received from  regarding 2018 Bariatric Benefits. Sent all information to Jose at surgeon's office to call and schedule patient for consultation.

## 2018-09-26 ENCOUNTER — TELEPHONE (OUTPATIENT)
Dept: OTHER | Age: 43
End: 2018-09-26

## 2018-09-26 NOTE — TELEPHONE ENCOUNTER
Patient calling for sleep study results. Told patient results were in chart. She said she has doctor appointment tomorrow and will discuss results with doctor then.

## 2018-09-26 NOTE — PROCEDURES
[address] [place] [address]  Accredited by the [facility] ([facility])    PATIENT'S NAME: OLU HENDRIX   ATTENDING PHYSICIAN: Gilmar Diaz MD   REFERRING PHYSICIAN: Gilmar Diaz MD   PATIENT ACCOUNT #: [id number] LOCATION: Slee events per hour for a combined arousal index of 4.3 events per hour. There were no significant periodic limb, and the lowest desaturation was to 86%.   The average heart rate was 62 beats per minute, and there was no significant bradycardia, asystole, tach

## 2018-09-27 ENCOUNTER — OFFICE VISIT (OUTPATIENT)
Dept: FAMILY MEDICINE CLINIC | Facility: CLINIC | Age: 43
End: 2018-09-27
Payer: COMMERCIAL

## 2018-09-27 VITALS
HEART RATE: 72 BPM | SYSTOLIC BLOOD PRESSURE: 142 MMHG | HEIGHT: 61 IN | TEMPERATURE: 99 F | WEIGHT: 293 LBS | BODY MASS INDEX: 55.32 KG/M2 | DIASTOLIC BLOOD PRESSURE: 83 MMHG

## 2018-09-27 DIAGNOSIS — E66.3 PATIENT OVERWEIGHT: Primary | ICD-10-CM

## 2018-09-27 PROCEDURE — 99212 OFFICE O/P EST SF 10 MIN: CPT | Performed by: FAMILY MEDICINE

## 2018-09-27 PROCEDURE — 99213 OFFICE O/P EST LOW 20 MIN: CPT | Performed by: FAMILY MEDICINE

## 2018-09-27 NOTE — PROGRESS NOTES
HPI:    Patient ID: Gordy Sneed is a 37year old female. Here for sleep study results. Showing mild sleep apnea recommendation to proceed with bariatric procedure without cpap titration. Also needs referral to see dietitian. Otherwise well.

## 2018-10-02 ENCOUNTER — OFFICE VISIT (OUTPATIENT)
Dept: SURGERY | Facility: CLINIC | Age: 43
End: 2018-10-02
Payer: COMMERCIAL

## 2018-10-02 ENCOUNTER — HOSPITAL ENCOUNTER (OUTPATIENT)
Dept: NUTRITION | Facility: HOSPITAL | Age: 43
Discharge: HOME | End: 2018-10-02
Attending: FAMILY MEDICINE
Payer: COMMERCIAL

## 2018-10-02 VITALS
OXYGEN SATURATION: 93 % | BODY MASS INDEX: 55.32 KG/M2 | HEIGHT: 61 IN | SYSTOLIC BLOOD PRESSURE: 166 MMHG | RESPIRATION RATE: 18 BRPM | WEIGHT: 293 LBS | HEART RATE: 79 BPM | DIASTOLIC BLOOD PRESSURE: 93 MMHG

## 2018-10-02 DIAGNOSIS — E66.01 MORBID OBESITY WITH BMI OF 60.0-69.9, ADULT (HCC): ICD-10-CM

## 2018-10-02 DIAGNOSIS — R06.83 SNORING: ICD-10-CM

## 2018-10-02 DIAGNOSIS — E66.3 PATIENT OVERWEIGHT: ICD-10-CM

## 2018-10-02 DIAGNOSIS — Z51.81 ENCOUNTER FOR THERAPEUTIC DRUG MONITORING: ICD-10-CM

## 2018-10-02 DIAGNOSIS — R60.0 LOWER EXTREMITY EDEMA: ICD-10-CM

## 2018-10-02 DIAGNOSIS — L83 ACANTHOSIS NIGRICANS: ICD-10-CM

## 2018-10-02 DIAGNOSIS — I10 ESSENTIAL HYPERTENSION: Primary | ICD-10-CM

## 2018-10-02 PROCEDURE — 97802 MEDICAL NUTRITION INDIV IN: CPT | Performed by: DIETITIAN, REGISTERED

## 2018-10-02 PROCEDURE — 99213 OFFICE O/P EST LOW 20 MIN: CPT | Performed by: INTERNAL MEDICINE

## 2018-10-02 NOTE — PROGRESS NOTES
Nutrition Assessment    Sergio Mayers is a 37year old female. Referred by:  Attending  Referring Physician Name: Robbert Gosselin Nutrition Therapy Comment: Overweight  Visit Information: Initial Visit 10/2/18    ANTHROPOMETRICS help with weight loss. PHYSICAL ACTIVITY  · Type: other: none  · Physical Assessment: Prosper Islas was doing Joe Dinamo a few months ago but now feels easily winded and cannot complete a class.  Recommend a low impact exercise like walking to help increase activity

## 2018-10-08 ENCOUNTER — OFFICE VISIT (OUTPATIENT)
Dept: SURGERY | Facility: CLINIC | Age: 43
End: 2018-10-08
Payer: COMMERCIAL

## 2018-10-08 VITALS — WEIGHT: 293 LBS | HEIGHT: 61 IN | BODY MASS INDEX: 55.32 KG/M2

## 2018-10-08 DIAGNOSIS — E66.01 CLASS 3 SEVERE OBESITY DUE TO EXCESS CALORIES WITH BODY MASS INDEX (BMI) OF 60.0 TO 69.9 IN ADULT, UNSPECIFIED WHETHER SERIOUS COMORBIDITY PRESENT (HCC): Primary | ICD-10-CM

## 2018-10-08 PROCEDURE — 97802 MEDICAL NUTRITION INDIV IN: CPT | Performed by: DIETITIAN, REGISTERED

## 2018-10-08 NOTE — PROGRESS NOTES
[address] [place] [address] AND WEIGHT LOSS CLINIC  [address] [place] [address]  Dept: [phone]  Loc: [phone]    10/08/18    Bariatric Initial Nutrition Assessment    Tiffany Patel is a 37year old female.     Refer oz  10/02/18 : (!) 325 lb 0.9 oz  09/27/18 : (!) 325 lb 12.8 oz  09/13/18 : (!) 326 lb  09/11/18 : (!) 326 lb  09/11/18 : (!) 325 lb      IBW:  Ideal body weight: 105 lb 6.1 oz  BMI: Body mass index is 61.45 kg/m².  Obesity Grade III, greater than or equal Medications:   •  escitalopram (LEXAPRO) 20 MG Oral Tab, Take 1 tablet (20 mg total) by mouth daily. , Disp: 90 tablet, Rfl: 1  •  Lisinopril-Hydrochlorothiazide 20-25 MG Oral Tab, Take 1 tablet by mouth daily. , Disp: 30 tablet, Rfl: 2    Vitamins/Minerals: soda. She is not currently taking a multivitamin, and we reviewed the bariatric vitamin options today - she will consider this and discuss again at her next appointment.      Nutrition Diagnosis: Morbid obesity related to undesirable food choices and physic

## 2018-10-17 ENCOUNTER — OFFICE VISIT (OUTPATIENT)
Dept: SURGERY | Facility: CLINIC | Age: 43
End: 2018-10-17
Payer: COMMERCIAL

## 2018-10-17 ENCOUNTER — TELEPHONE (OUTPATIENT)
Dept: SURGERY | Facility: CLINIC | Age: 43
End: 2018-10-17

## 2018-10-17 VITALS
RESPIRATION RATE: 16 BRPM | HEIGHT: 61 IN | BODY MASS INDEX: 55.32 KG/M2 | DIASTOLIC BLOOD PRESSURE: 92 MMHG | WEIGHT: 293 LBS | SYSTOLIC BLOOD PRESSURE: 176 MMHG | HEART RATE: 70 BPM

## 2018-10-17 DIAGNOSIS — I10 ESSENTIAL HYPERTENSION: ICD-10-CM

## 2018-10-17 DIAGNOSIS — Z51.81 ENCOUNTER FOR THERAPEUTIC DRUG MONITORING: Primary | ICD-10-CM

## 2018-10-17 DIAGNOSIS — E66.01 MORBID OBESITY WITH BMI OF 60.0-69.9, ADULT (HCC): ICD-10-CM

## 2018-10-17 RX ORDER — LISINOPRIL AND HYDROCHLOROTHIAZIDE 25; 20 MG/1; MG/1
1 TABLET ORAL DAILY
Qty: 90 TABLET | Refills: 0 | Status: SHIPPED | OUTPATIENT
Start: 2018-10-17 | End: 2018-12-12

## 2018-10-17 NOTE — H&P
[address], [address] [address]  Dept: [phone]    10/17/2018  Bariatric New Patient Evaluation    Chief Complaint:  Morbid obesity     History of Present Illness: insecurity - worry: Not on file      Food insecurity - inability: Not on file      Transportation needs - medical: Not on file      Transportation needs - non-medical: Not on file    Occupational History      Not on file    Tobacco Use      Smoking status: oriented, normal affect  Skin: warm, dry    Assessment: 37year old female with chronic morbid obesity who would benefit from significant and sustained weight loss. Her weight distribution favors her midsection which is not that favorable for surgery.   Wi

## 2018-10-18 ENCOUNTER — OFFICE VISIT (OUTPATIENT)
Dept: NUTRITION/OBESITY MEDICINE | Facility: HOSPITAL | Age: 43
End: 2018-10-18
Attending: SINGLE SPECIALTY
Payer: COMMERCIAL

## 2018-10-18 DIAGNOSIS — E66.01 MORBID OBESITY DUE TO EXCESS CALORIES (HCC): Primary | ICD-10-CM

## 2018-10-18 PROCEDURE — 96101 PSYCL TSTG PR HR F2F TIME W/PT: CPT | Performed by: OTHER

## 2018-10-18 NOTE — TELEPHONE ENCOUNTER
Hypertensive Medications  Protocol Criteria:  · Appointment scheduled in the past 6 months or in the next 3 months  · BMP or CMP in the past 12 months  · Creatinine result < 2  Recent Outpatient Visits            Today Encounter for therapeutic drug monito Results   Component Value Date     (H) 08/28/2018    BUN 11 08/28/2018    CREATSERUM 0.66 08/28/2018    BUNCREA 16.7 08/28/2018    GFRNAA >60 08/28/2018    GFRAA >60 08/28/2018    CA 9.4 08/28/2018    ALKPHOS 86 06/28/2016    AST 88 (H) 08/03/2018

## 2018-10-18 NOTE — PROGRESS NOTES
Psychological Evaluation    Patient Name: Vianeysebastian Kiley  Visit Date:  10/18/2018  :   1975    Reason for Referral:    Gatito Donald is a 37year old  American female who was referred for a psychological evaluation prior to being scheduled for ba gained weight with the birth of her son approximately 11 years ago.   Regarding attempts to lose weight throughout the years, Gatito Donald stated that she altered her diet at various periods, notably eliminating soda from her diet (an 8 can a day habit at one bharati eliminated soda from her diet, along with pasta, rice, and red meat, after she was diagnosed with a fatty liver.   Regarding eating patterns, Yosvany Perez denied any binge or stress eating, stating that when she has a desire to reach for something she substitutes feelings.     Dietary Habits    On the Rapid Eating Assessment for Patients (REAP) Ye Lofton endorsed good eating patterns overall, with the exception of endorsing that she usually/often: eats less than 2-3 servings of fruit a day and uses 2% or whole milk, wh with the surgery. It was a pleasure working with Lesvia Morales, please feel free to contact me if you have any additional questions or concerns.     Vinita Arana PsyD  10/18/2018  12:49 PM

## 2018-10-19 ENCOUNTER — LAB ENCOUNTER (OUTPATIENT)
Dept: LAB | Facility: HOSPITAL | Age: 43
End: 2018-10-19
Attending: SINGLE SPECIALTY
Payer: COMMERCIAL

## 2018-10-19 DIAGNOSIS — Z51.81 ENCOUNTER FOR THERAPEUTIC DRUG MONITORING: ICD-10-CM

## 2018-10-19 DIAGNOSIS — I10 ESSENTIAL HYPERTENSION: ICD-10-CM

## 2018-10-19 DIAGNOSIS — E66.01 MORBID OBESITY WITH BMI OF 60.0-69.9, ADULT (HCC): ICD-10-CM

## 2018-10-19 PROCEDURE — 84100 ASSAY OF PHOSPHORUS: CPT

## 2018-10-19 PROCEDURE — 82306 VITAMIN D 25 HYDROXY: CPT

## 2018-10-19 PROCEDURE — 83735 ASSAY OF MAGNESIUM: CPT

## 2018-10-19 PROCEDURE — 36415 COLL VENOUS BLD VENIPUNCTURE: CPT

## 2018-10-19 PROCEDURE — 80061 LIPID PANEL: CPT

## 2018-10-19 PROCEDURE — 82746 ASSAY OF FOLIC ACID SERUM: CPT

## 2018-10-19 PROCEDURE — 84425 ASSAY OF VITAMIN B-1: CPT

## 2018-10-19 PROCEDURE — 82607 VITAMIN B-12: CPT

## 2018-10-19 PROCEDURE — 82728 ASSAY OF FERRITIN: CPT

## 2018-10-26 ENCOUNTER — OFFICE VISIT (OUTPATIENT)
Dept: FAMILY MEDICINE CLINIC | Facility: CLINIC | Age: 43
End: 2018-10-26
Payer: COMMERCIAL

## 2018-10-26 VITALS
HEART RATE: 76 BPM | WEIGHT: 293 LBS | SYSTOLIC BLOOD PRESSURE: 136 MMHG | BODY MASS INDEX: 55.32 KG/M2 | TEMPERATURE: 97 F | DIASTOLIC BLOOD PRESSURE: 79 MMHG | HEIGHT: 61 IN

## 2018-10-26 DIAGNOSIS — J06.9 UPPER RESPIRATORY INFECTION, ACUTE: Primary | ICD-10-CM

## 2018-10-26 PROCEDURE — 99212 OFFICE O/P EST SF 10 MIN: CPT | Performed by: FAMILY MEDICINE

## 2018-10-26 PROCEDURE — 99213 OFFICE O/P EST LOW 20 MIN: CPT | Performed by: FAMILY MEDICINE

## 2018-10-26 NOTE — PROGRESS NOTES
HPI:    Patient ID: Lashonda Arguello is a 37year old female. Sore throat for 2 days. .. No fever. No cough no nasal congestion. Also here for f/u hypertension and blood test.   Taking medications regularly.          Review of Systems   Constitutional: P Signed Prescriptions Disp Refills   • Cholecalciferol (VITAMIN D-3) 5000 units Oral Tab 90 tablet 3     Sig: Take 1 tablet by mouth daily.        Imaging & Referrals:  None       JG#[id number]

## 2018-10-31 ENCOUNTER — HOSPITAL ENCOUNTER (EMERGENCY)
Facility: HOSPITAL | Age: 43
Discharge: HOME | End: 2018-10-31
Payer: COMMERCIAL

## 2018-10-31 ENCOUNTER — APPOINTMENT (OUTPATIENT)
Dept: GENERAL RADIOLOGY | Facility: HOSPITAL | Age: 43
End: 2018-10-31
Attending: NURSE PRACTITIONER
Payer: COMMERCIAL

## 2018-10-31 VITALS
SYSTOLIC BLOOD PRESSURE: 154 MMHG | RESPIRATION RATE: 16 BRPM | WEIGHT: 293 LBS | TEMPERATURE: 98 F | OXYGEN SATURATION: 99 % | HEIGHT: 61 IN | HEART RATE: 62 BPM | DIASTOLIC BLOOD PRESSURE: 88 MMHG | BODY MASS INDEX: 55.32 KG/M2

## 2018-10-31 DIAGNOSIS — S80.11XA CONTUSION OF RIGHT LOWER EXTREMITY, INITIAL ENCOUNTER: ICD-10-CM

## 2018-10-31 DIAGNOSIS — S00.83XA CONTUSION OF FACE, INITIAL ENCOUNTER: Primary | ICD-10-CM

## 2018-10-31 PROCEDURE — 99284 EMERGENCY DEPT VISIT MOD MDM: CPT

## 2018-10-31 PROCEDURE — 70150 X-RAY EXAM OF FACIAL BONES: CPT | Performed by: NURSE PRACTITIONER

## 2018-10-31 PROCEDURE — 73560 X-RAY EXAM OF KNEE 1 OR 2: CPT | Performed by: NURSE PRACTITIONER

## 2018-10-31 PROCEDURE — 73140 X-RAY EXAM OF FINGER(S): CPT | Performed by: NURSE PRACTITIONER

## 2018-10-31 RX ORDER — IBUPROFEN 600 MG/1
600 TABLET ORAL EVERY 6 HOURS PRN
Qty: 20 TABLET | Refills: 0 | Status: SHIPPED | OUTPATIENT
Start: 2018-10-31 | End: 2018-11-07

## 2018-10-31 NOTE — ED PROVIDER NOTES
Patient Seen in: [facility] AND [facility] Emergency Department    History   CC: facial pain  HPI: Vania Ruggiero 37year old female  who presents to the ER c/o right-sided facial pain as well as left dorsal, distal finger and right lateral knee pain status pos ibuprofen 600 MG Oral Tab,  Take 1 tablet (600 mg total) by mouth every 6 (six) hours as needed for Pain. Take this with food   Cholecalciferol (VITAMIN D-3) 5000 units Oral Tab,  Take 1 tablet by mouth daily.    LISINOPRIL-HYDROCHLOROTHIAZIDE 20-25 MG Oral right lower extremity. Skin is otherwise pink warm and dry throughout, mmm, cap refill <2seconds  Neuro - A&O x4, 2+ reflexes bilat.  biceps, triceps, brachioradialis, patellar, & Achilles, sensation equal to both medial and lateral aspects of left hand di

## 2018-10-31 NOTE — ED INITIAL ASSESSMENT (HPI)
Ramesh Singh is here for eval of pain to right head/face and left thumb after pipe fell on her yesterday.

## 2018-11-02 ENCOUNTER — OFFICE VISIT (OUTPATIENT)
Dept: FAMILY MEDICINE CLINIC | Facility: CLINIC | Age: 43
End: 2018-11-02
Payer: COMMERCIAL

## 2018-11-02 VITALS
HEART RATE: 72 BPM | WEIGHT: 293 LBS | BODY MASS INDEX: 55.32 KG/M2 | HEIGHT: 61 IN | DIASTOLIC BLOOD PRESSURE: 75 MMHG | TEMPERATURE: 97 F | SYSTOLIC BLOOD PRESSURE: 134 MMHG

## 2018-11-02 DIAGNOSIS — M79.645 FINGER PAIN, LEFT: Primary | ICD-10-CM

## 2018-11-02 DIAGNOSIS — S00.83XA FACIAL CONTUSION, INITIAL ENCOUNTER: ICD-10-CM

## 2018-11-02 DIAGNOSIS — M25.561 RIGHT KNEE PAIN, UNSPECIFIED CHRONICITY: ICD-10-CM

## 2018-11-02 PROCEDURE — 99212 OFFICE O/P EST SF 10 MIN: CPT | Performed by: FAMILY MEDICINE

## 2018-11-02 PROCEDURE — 99213 OFFICE O/P EST LOW 20 MIN: CPT | Performed by: FAMILY MEDICINE

## 2018-11-02 NOTE — PROGRESS NOTES
HPI:    Patient ID: Rhianna Green is a 37year old female. Was in er due to accident at the [facility]. Please see the note from er for more info. X rays done, doing better except the left thumb pain .    Was seen by a hand surgeon who recommende

## 2018-11-06 ENCOUNTER — OFFICE VISIT (OUTPATIENT)
Dept: SURGERY | Facility: CLINIC | Age: 43
End: 2018-11-06
Payer: COMMERCIAL

## 2018-11-06 VITALS — BODY MASS INDEX: 55.32 KG/M2 | HEIGHT: 61 IN | WEIGHT: 293 LBS

## 2018-11-06 DIAGNOSIS — E66.01 MORBID OBESITY WITH BMI OF 60.0-69.9, ADULT (HCC): Primary | ICD-10-CM

## 2018-11-06 PROCEDURE — 97803 MED NUTRITION INDIV SUBSEQ: CPT | Performed by: DIETITIAN, REGISTERED

## 2018-11-06 PROCEDURE — 0358T BIA WHOLE BODY: CPT | Performed by: DIETITIAN, REGISTERED

## 2018-11-06 NOTE — PROGRESS NOTES
[address]  [address] [address]  Dept: [phone]  Loc: [phone]    11/06/18      Bariatric Follow-up Nutrition Session    Flakita Gustafson is a 37year old female.      Ass Component Value Date    B12 367 10/19/2018     Lab Results   Component Value Date    KYBD86FA 19.9 (L) 10/19/2018     Lab Results   Component Value Date/Time    THIAMINE 151 10/19/2018 02:52 PM      No results found for: VITB1  Lab Results   Component Va now. Steps were lower, 2-4,000/day. Did body composition analysis which showed BMR 1736 cals, percentage body fat 57%, skeletal muscle mass 78.9 lbs with excellent upper body strength of 125%, good trunk at 112%, but legs quite a bit lower at 78%.  Now off

## 2018-11-09 ENCOUNTER — HOSPITAL ENCOUNTER (OUTPATIENT)
Dept: MRI IMAGING | Facility: HOSPITAL | Age: 43
Discharge: HOME | End: 2018-11-09
Attending: FAMILY MEDICINE
Payer: COMMERCIAL

## 2018-11-09 DIAGNOSIS — M79.645 FINGER PAIN, LEFT: ICD-10-CM

## 2018-11-09 PROCEDURE — 73218 MRI UPPER EXTREMITY W/O DYE: CPT | Performed by: FAMILY MEDICINE

## 2018-11-13 ENCOUNTER — TELEPHONE (OUTPATIENT)
Dept: OTHER | Age: 43
End: 2018-11-13

## 2018-11-14 ENCOUNTER — OFFICE VISIT (OUTPATIENT)
Dept: FAMILY MEDICINE CLINIC | Facility: CLINIC | Age: 43
End: 2018-11-14
Payer: COMMERCIAL

## 2018-11-14 VITALS
HEIGHT: 61 IN | SYSTOLIC BLOOD PRESSURE: 135 MMHG | BODY MASS INDEX: 55.32 KG/M2 | DIASTOLIC BLOOD PRESSURE: 78 MMHG | WEIGHT: 293 LBS | HEART RATE: 65 BPM | TEMPERATURE: 98 F

## 2018-11-14 DIAGNOSIS — M79.646 PAIN OF FINGER, UNSPECIFIED LATERALITY: Primary | ICD-10-CM

## 2018-11-14 PROCEDURE — 97810 ACUP 1/> WO ESTIM 1ST 15 MIN: CPT | Performed by: FAMILY MEDICINE

## 2018-11-14 PROCEDURE — 99212 OFFICE O/P EST SF 10 MIN: CPT | Performed by: FAMILY MEDICINE

## 2018-11-14 NOTE — PROGRESS NOTES
HPI:    Patient ID: Meghan Diggs is a 37year old female. Patient was seen by ortho for finger pain. Mri was ordered, small cyst benign appearing subungual noted. No other changes.    Continues having pain        Review of Systems   Constitutional: Ne

## 2018-11-16 ENCOUNTER — OFFICE VISIT (OUTPATIENT)
Dept: PULMONOLOGY | Facility: CLINIC | Age: 43
End: 2018-11-16
Payer: COMMERCIAL

## 2018-11-16 ENCOUNTER — OFFICE VISIT (OUTPATIENT)
Dept: SURGERY | Facility: CLINIC | Age: 43
End: 2018-11-16
Payer: COMMERCIAL

## 2018-11-16 VITALS
HEIGHT: 61 IN | WEIGHT: 293 LBS | HEART RATE: 71 BPM | RESPIRATION RATE: 18 BRPM | SYSTOLIC BLOOD PRESSURE: 130 MMHG | DIASTOLIC BLOOD PRESSURE: 80 MMHG | BODY MASS INDEX: 55.32 KG/M2 | OXYGEN SATURATION: 95 %

## 2018-11-16 VITALS
SYSTOLIC BLOOD PRESSURE: 135 MMHG | DIASTOLIC BLOOD PRESSURE: 78 MMHG | HEIGHT: 61 IN | HEART RATE: 66 BPM | BODY MASS INDEX: 55.32 KG/M2 | RESPIRATION RATE: 16 BRPM | WEIGHT: 293 LBS

## 2018-11-16 DIAGNOSIS — G47.33 OSA (OBSTRUCTIVE SLEEP APNEA): Primary | ICD-10-CM

## 2018-11-16 DIAGNOSIS — R60.0 LOWER EXTREMITY EDEMA: ICD-10-CM

## 2018-11-16 DIAGNOSIS — L83 ACANTHOSIS NIGRICANS: ICD-10-CM

## 2018-11-16 DIAGNOSIS — E66.01 MORBID OBESITY WITH BMI OF 60.0-69.9, ADULT (HCC): ICD-10-CM

## 2018-11-16 DIAGNOSIS — I10 ESSENTIAL HYPERTENSION: Primary | ICD-10-CM

## 2018-11-16 DIAGNOSIS — Z51.81 ENCOUNTER FOR THERAPEUTIC DRUG MONITORING: ICD-10-CM

## 2018-11-16 PROCEDURE — 99213 OFFICE O/P EST LOW 20 MIN: CPT | Performed by: INTERNAL MEDICINE

## 2018-11-16 PROCEDURE — 99244 OFF/OP CNSLTJ NEW/EST MOD 40: CPT | Performed by: INTERNAL MEDICINE

## 2018-11-16 PROCEDURE — 99212 OFFICE O/P EST SF 10 MIN: CPT | Performed by: INTERNAL MEDICINE

## 2018-11-16 NOTE — PROGRESS NOTES
[facility] BARIATRIC AND WEIGHT LOSS CLINIC  [address] [place] [address]  Dept: [phone]  Loc: [phone]     Date:   2016    Patient:  Chon Connors  :      1975  MRN:      [id number]    Chief Complaint:  Tosin Plunkett Not on file    Tobacco Use      Smoking status: Former Smoker        Quit date: 2011        Years since quittin.8      Smokeless tobacco: Never Used    Substance and Sexual Activity      Alcohol use: No        Comment: rarely      Drug use:  No Reviewed and Discussed    walks    ROS:    Constitutional: positive for fatigue  Respiratory: positive for dyspnea on exertion  Cardiovascular: negative  Gastrointestinal: negative  Musculoskeletal:positive for arthralgias and back pain  Neurological: nega Increase activity-upper body exercises, walk 10 minutes per day. 4. Increase fruit and vegetable servings to 5-6 per day.       Attended seminar    Met with psych, RD  Dr Jet Dyer for Juan en Y  Has appointment today with pulmonary  Has appointment with cardio

## 2018-11-16 NOTE — H&P
Referring Physician  Israel Ybarra MD    Chief Complaint  Preoperative clearance    History of Present Illness  Patient is a 12-year-old female who presents today for preoperative clearance for upcoming bariatric weight loss surgery.   She denies any histor deficits  Skin: warm, dry  Lymphatic: no supraclavicular lymphadenopathy     Imaging  CT chest performed on 8/28/2018 revealed no significant intrathoracic abnormalities    Pulmonary Function Testing  None available to review    Assessment  1.   Morbid obes

## 2018-11-19 ENCOUNTER — TELEPHONE (OUTPATIENT)
Dept: FAMILY MEDICINE CLINIC | Facility: CLINIC | Age: 43
End: 2018-11-19

## 2018-11-19 NOTE — TELEPHONE ENCOUNTER
Dr. Clark Dose office calling to check status of surgical clearance. Request was faxed on 11/15/2018 and patient scheduled for 11/27/2019 under [facility]. OP procedure, Excision of lesion of LT Thumb.      Call back [phone]  Fax [phone]    Please advise

## 2018-11-21 ENCOUNTER — OFFICE VISIT (OUTPATIENT)
Dept: CARDIOLOGY CLINIC | Facility: CLINIC | Age: 43
End: 2018-11-21
Payer: COMMERCIAL

## 2018-11-21 ENCOUNTER — OFFICE VISIT (OUTPATIENT)
Dept: FAMILY MEDICINE CLINIC | Facility: CLINIC | Age: 43
End: 2018-11-21
Payer: COMMERCIAL

## 2018-11-21 VITALS
HEART RATE: 72 BPM | DIASTOLIC BLOOD PRESSURE: 78 MMHG | SYSTOLIC BLOOD PRESSURE: 130 MMHG | WEIGHT: 293 LBS | BODY MASS INDEX: 55.32 KG/M2 | HEIGHT: 61 IN

## 2018-11-21 VITALS
WEIGHT: 293 LBS | DIASTOLIC BLOOD PRESSURE: 76 MMHG | HEIGHT: 61 IN | BODY MASS INDEX: 55.32 KG/M2 | SYSTOLIC BLOOD PRESSURE: 129 MMHG | TEMPERATURE: 98 F | HEART RATE: 64 BPM

## 2018-11-21 DIAGNOSIS — E66.01 MORBID OBESITY WITH BMI OF 60.0-69.9, ADULT (HCC): ICD-10-CM

## 2018-11-21 DIAGNOSIS — Z01.818 PREOPERATIVE CLEARANCE: Primary | ICD-10-CM

## 2018-11-21 DIAGNOSIS — I10 ESSENTIAL HYPERTENSION: Primary | ICD-10-CM

## 2018-11-21 DIAGNOSIS — Z01.810 PREOP CARDIOVASCULAR EXAM: ICD-10-CM

## 2018-11-21 PROCEDURE — 99242 OFF/OP CONSLTJ NEW/EST SF 20: CPT | Performed by: FAMILY MEDICINE

## 2018-11-21 PROCEDURE — 99205 OFFICE O/P NEW HI 60 MIN: CPT | Performed by: INTERNAL MEDICINE

## 2018-11-21 PROCEDURE — 99212 OFFICE O/P EST SF 10 MIN: CPT | Performed by: INTERNAL MEDICINE

## 2018-11-21 PROCEDURE — 99212 OFFICE O/P EST SF 10 MIN: CPT | Performed by: FAMILY MEDICINE

## 2018-11-21 NOTE — TELEPHONE ENCOUNTER
Dr Raines Better patient was last seen 11/14, does patient need to be seen? There are no available appointment please advise.

## 2018-11-21 NOTE — PROGRESS NOTES
Cardiology Consult Note    11/21/2018    Lashonda Arguello is a 37year old female. HPI:   70-year-old female with history of hypertension, morbid obesity, depression presents for initial visit.   Patient reports feeling well denies any exertional chest pain JVD, no bruits  LUNGS: clear to auscultation,no wheeze  CARDIO: RRR without murmur,nl S1,S2,good distal pulse  GI: good BS's,no masses, HSM or tenderness  EXTREMITIES: no cyanosis, clubbing or edema  NEURO:no focal deficits      Assessment   ASSESSMENT AND

## 2018-11-26 NOTE — PROGRESS NOTES
HPI:    Patient ID: Arturo Duarte is a 37year old female. Here for preop physical. Going to have small surgery on the finger surgeon requesting clearance. Review of Systems   Constitutional: Negative.   Negative for activity change, appetite c

## 2018-11-27 ENCOUNTER — LAB REQUISITION (OUTPATIENT)
Dept: LAB | Facility: HOSPITAL | Age: 43
End: 2018-11-27
Payer: COMMERCIAL

## 2018-11-27 DIAGNOSIS — Z01.89 ENCOUNTER FOR OTHER SPECIFIED SPECIAL EXAMINATIONS: ICD-10-CM

## 2018-11-27 PROCEDURE — 88342 IMHCHEM/IMCYTCHM 1ST ANTB: CPT | Performed by: PLASTIC SURGERY

## 2018-11-27 PROCEDURE — 88307 TISSUE EXAM BY PATHOLOGIST: CPT | Performed by: PLASTIC SURGERY

## 2018-11-28 ENCOUNTER — OFFICE VISIT (OUTPATIENT)
Dept: SURGERY | Facility: CLINIC | Age: 43
End: 2018-11-28
Payer: COMMERCIAL

## 2018-11-28 VITALS
HEIGHT: 61 IN | WEIGHT: 293 LBS | DIASTOLIC BLOOD PRESSURE: 89 MMHG | BODY MASS INDEX: 55.32 KG/M2 | RESPIRATION RATE: 16 BRPM | HEART RATE: 63 BPM | SYSTOLIC BLOOD PRESSURE: 169 MMHG

## 2018-11-28 DIAGNOSIS — R60.0 LOWER EXTREMITY EDEMA: ICD-10-CM

## 2018-11-28 DIAGNOSIS — E66.01 MORBID OBESITY WITH BMI OF 60.0-69.9, ADULT (HCC): ICD-10-CM

## 2018-11-28 DIAGNOSIS — I10 ESSENTIAL HYPERTENSION: Primary | ICD-10-CM

## 2018-11-28 NOTE — PROGRESS NOTES
[address], [address] [address]  Dept: [phone]    11/28/2018   Bariatric Patient Follow-up Evaluation    Chief Complaint:  Morbid obesity     History of Present I 90 tablet, Rfl: 0     Allergies:    Radiology Contrast *    HIVES, ITCHING    ROS:      Constitutional: negative  HEENT: negative  Respiratory: negative  Cardiovascular: negative  Gastrointestinal: negative  Genitourinary: negative  Musculoskeletal: negati completed her pre-op requirements. The patient remains most interested in gastric bypass. We will be submitting her case to insurance and I am hoping to gain approval in time for surgery on Dec 17th.   I discussed with the patient that if we are unable to

## 2018-11-29 ENCOUNTER — OFFICE VISIT (OUTPATIENT)
Dept: SURGERY | Facility: CLINIC | Age: 43
End: 2018-11-29
Payer: COMMERCIAL

## 2018-11-29 VITALS — WEIGHT: 293 LBS | BODY MASS INDEX: 55.32 KG/M2 | HEIGHT: 61 IN

## 2018-11-29 DIAGNOSIS — E66.01 MORBID OBESITY WITH BMI OF 60.0-69.9, ADULT (HCC): Primary | ICD-10-CM

## 2018-11-29 PROCEDURE — 97803 MED NUTRITION INDIV SUBSEQ: CPT | Performed by: DIETITIAN, REGISTERED

## 2018-11-29 NOTE — PROGRESS NOTES
[address] [place] [address] AND WE[facility] CLINIC  [address] [place] [address] [address] [address]  Dept: [phone]  Loc: [phone]    11/29/18    Bariatric Liquid Protein Nutrition Session    Lisa Medina is a 37year old female    As Date     08/28/2018     05/31/2018     06/28/2016        Diabetes:    HGBA1C:    Lab Results   Component Value Date    A1C 5.1 05/31/2018    A1C 5.7 01/28/2015       Lipid Panel:  Lab Results   Component Value Date    [facility] 605 10/19 diet, Gave hospital orientation, Reviewed quizzes, Took picture and Materials given overall guidelines for liquid protein diet  Recommendations/goals: start Dec 3rd, taper off food over the weekend, do 4-5 High Protein Ensures/day  Keep it going goals: fadumo

## 2018-12-06 ENCOUNTER — TELEPHONE (OUTPATIENT)
Dept: SURGERY | Facility: CLINIC | Age: 43
End: 2018-12-06

## 2018-12-06 NOTE — TELEPHONE ENCOUNTER
Patient aware of her lab results. She is currently taking  Bariatric chewable vitamins. Per Dr. Chaitanya Chau she was told not to take a separate supplement for Vitamin D.  Patient will buy otc B-12 500 mcg-1000 mcg daily

## 2018-12-12 ENCOUNTER — OFFICE VISIT (OUTPATIENT)
Dept: FAMILY MEDICINE CLINIC | Facility: CLINIC | Age: 43
End: 2018-12-12
Payer: COMMERCIAL

## 2018-12-12 VITALS
WEIGHT: 293 LBS | SYSTOLIC BLOOD PRESSURE: 131 MMHG | BODY MASS INDEX: 55.32 KG/M2 | DIASTOLIC BLOOD PRESSURE: 74 MMHG | TEMPERATURE: 98 F | HEIGHT: 61 IN | HEART RATE: 86 BPM

## 2018-12-12 DIAGNOSIS — I10 ESSENTIAL HYPERTENSION: Primary | ICD-10-CM

## 2018-12-12 PROCEDURE — 99213 OFFICE O/P EST LOW 20 MIN: CPT | Performed by: FAMILY MEDICINE

## 2018-12-12 PROCEDURE — 99212 OFFICE O/P EST SF 10 MIN: CPT | Performed by: FAMILY MEDICINE

## 2018-12-12 RX ORDER — TRAMADOL HYDROCHLORIDE 50 MG/1
50 TABLET ORAL EVERY 6 HOURS PRN
Qty: 30 TABLET | Refills: 1 | Status: SHIPPED | OUTPATIENT
Start: 2018-12-12 | End: 2018-12-13

## 2018-12-12 RX ORDER — LISINOPRIL 30 MG/1
30 TABLET ORAL DAILY
Qty: 90 TABLET | Refills: 1 | Status: ON HOLD | OUTPATIENT
Start: 2018-12-12 | End: 2018-12-19

## 2018-12-12 NOTE — PROGRESS NOTES
HPI:    Patient ID: Yajaira Porter is a 37year old female. Patient having surgery soon. Had all preop tests done. Was advised to stop taking hctz before the surgery.    Doing well  Asking though for pain medication as had recently finger surgery and ha

## 2018-12-13 RX ORDER — MELATONIN
1000 DAILY
COMMUNITY
End: 2019-01-10

## 2018-12-14 ENCOUNTER — LAB ENCOUNTER (OUTPATIENT)
Dept: LAB | Age: 43
End: 2018-12-14
Attending: SINGLE SPECIALTY
Payer: COMMERCIAL

## 2018-12-14 DIAGNOSIS — Z01.818 PREOP TESTING: ICD-10-CM

## 2018-12-14 PROCEDURE — 86901 BLOOD TYPING SEROLOGIC RH(D): CPT

## 2018-12-14 PROCEDURE — 86850 RBC ANTIBODY SCREEN: CPT

## 2018-12-14 PROCEDURE — 36415 COLL VENOUS BLD VENIPUNCTURE: CPT

## 2018-12-14 PROCEDURE — 86900 BLOOD TYPING SEROLOGIC ABO: CPT

## 2018-12-17 ENCOUNTER — HOSPITAL ENCOUNTER (INPATIENT)
Facility: HOSPITAL | Age: 43
LOS: 2 days | Discharge: HOME | DRG: 621 | End: 2018-12-19
Attending: SINGLE SPECIALTY | Admitting: SINGLE SPECIALTY
Payer: COMMERCIAL

## 2018-12-17 ENCOUNTER — ANESTHESIA EVENT (OUTPATIENT)
Dept: SURGERY | Facility: HOSPITAL | Age: 43
DRG: 621 | End: 2018-12-17
Payer: COMMERCIAL

## 2018-12-17 ENCOUNTER — ANESTHESIA (OUTPATIENT)
Dept: SURGERY | Facility: HOSPITAL | Age: 43
DRG: 621 | End: 2018-12-17
Payer: COMMERCIAL

## 2018-12-17 DIAGNOSIS — Z01.818 PREOP TESTING: Primary | ICD-10-CM

## 2018-12-17 PROCEDURE — 99232 SBSQ HOSP IP/OBS MODERATE 35: CPT | Performed by: HOSPITALIST

## 2018-12-17 PROCEDURE — 0D164ZA BYPASS STOMACH TO JEJUNUM, PERCUTANEOUS ENDOSCOPIC APPROACH: ICD-10-PCS | Performed by: SINGLE SPECIALTY

## 2018-12-17 RX ORDER — MORPHINE SULFATE 2 MG/ML
2 INJECTION, SOLUTION INTRAMUSCULAR; INTRAVENOUS EVERY 2 HOUR PRN
Status: DISCONTINUED | OUTPATIENT
Start: 2018-12-17 | End: 2018-12-19

## 2018-12-17 RX ORDER — HEPARIN SODIUM 5000 [USP'U]/ML
5000 INJECTION, SOLUTION INTRAVENOUS; SUBCUTANEOUS EVERY 8 HOURS SCHEDULED
Status: DISCONTINUED | OUTPATIENT
Start: 2018-12-17 | End: 2018-12-19

## 2018-12-17 RX ORDER — METOCLOPRAMIDE 10 MG/1
10 TABLET ORAL ONCE
Status: COMPLETED | OUTPATIENT
Start: 2018-12-17 | End: 2018-12-17

## 2018-12-17 RX ORDER — SODIUM CHLORIDE 0.9 % (FLUSH) 0.9 %
10 SYRINGE (ML) INJECTION AS NEEDED
Status: DISCONTINUED | OUTPATIENT
Start: 2018-12-17 | End: 2018-12-19

## 2018-12-17 RX ORDER — MORPHINE SULFATE 4 MG/ML
2 INJECTION, SOLUTION INTRAMUSCULAR; INTRAVENOUS EVERY 10 MIN PRN
Status: DISCONTINUED | OUTPATIENT
Start: 2018-12-17 | End: 2018-12-17 | Stop reason: HOSPADM

## 2018-12-17 RX ORDER — HYDROCODONE BITARTRATE AND ACETAMINOPHEN 5; 325 MG/1; MG/1
2 TABLET ORAL AS NEEDED
Status: DISCONTINUED | OUTPATIENT
Start: 2018-12-17 | End: 2018-12-17 | Stop reason: HOSPADM

## 2018-12-17 RX ORDER — HYDROCODONE BITARTRATE AND ACETAMINOPHEN 5; 325 MG/1; MG/1
1 TABLET ORAL AS NEEDED
Status: DISCONTINUED | OUTPATIENT
Start: 2018-12-17 | End: 2018-12-17 | Stop reason: HOSPADM

## 2018-12-17 RX ORDER — KETOROLAC TROMETHAMINE 15 MG/ML
15 INJECTION, SOLUTION INTRAMUSCULAR; INTRAVENOUS EVERY 6 HOURS
Status: COMPLETED | OUTPATIENT
Start: 2018-12-17 | End: 2018-12-19

## 2018-12-17 RX ORDER — ROCURONIUM BROMIDE 10 MG/ML
INJECTION, SOLUTION INTRAVENOUS AS NEEDED
Status: DISCONTINUED | OUTPATIENT
Start: 2018-12-17 | End: 2018-12-17 | Stop reason: SURG

## 2018-12-17 RX ORDER — ONDANSETRON 2 MG/ML
4 INJECTION INTRAMUSCULAR; INTRAVENOUS EVERY 6 HOURS PRN
Status: DISCONTINUED | OUTPATIENT
Start: 2018-12-17 | End: 2018-12-19

## 2018-12-17 RX ORDER — HEPARIN SODIUM 5000 [USP'U]/ML
5000 INJECTION, SOLUTION INTRAVENOUS; SUBCUTANEOUS ONCE
Status: COMPLETED | OUTPATIENT
Start: 2018-12-17 | End: 2018-12-17

## 2018-12-17 RX ORDER — KETOROLAC TROMETHAMINE 30 MG/ML
30 INJECTION, SOLUTION INTRAMUSCULAR; INTRAVENOUS EVERY 6 HOURS
Status: COMPLETED | OUTPATIENT
Start: 2018-12-17 | End: 2018-12-19

## 2018-12-17 RX ORDER — ACETAMINOPHEN 10 MG/ML
1000 INJECTION, SOLUTION INTRAVENOUS EVERY 6 HOURS
Status: COMPLETED | OUTPATIENT
Start: 2018-12-17 | End: 2018-12-18

## 2018-12-17 RX ORDER — BUPIVACAINE HYDROCHLORIDE AND EPINEPHRINE 5; 5 MG/ML; UG/ML
INJECTION, SOLUTION PERINEURAL AS NEEDED
Status: DISCONTINUED | OUTPATIENT
Start: 2018-12-17 | End: 2018-12-17 | Stop reason: HOSPADM

## 2018-12-17 RX ORDER — GLYCOPYRROLATE 0.2 MG/ML
INJECTION INTRAMUSCULAR; INTRAVENOUS AS NEEDED
Status: DISCONTINUED | OUTPATIENT
Start: 2018-12-17 | End: 2018-12-17 | Stop reason: SURG

## 2018-12-17 RX ORDER — DEXAMETHASONE SODIUM PHOSPHATE 4 MG/ML
VIAL (ML) INJECTION AS NEEDED
Status: DISCONTINUED | OUTPATIENT
Start: 2018-12-17 | End: 2018-12-17 | Stop reason: SURG

## 2018-12-17 RX ORDER — ACETAMINOPHEN 500 MG
1000 TABLET ORAL ONCE
Status: COMPLETED | OUTPATIENT
Start: 2018-12-17 | End: 2018-12-17

## 2018-12-17 RX ORDER — NALOXONE HYDROCHLORIDE 0.4 MG/ML
80 INJECTION, SOLUTION INTRAMUSCULAR; INTRAVENOUS; SUBCUTANEOUS AS NEEDED
Status: DISCONTINUED | OUTPATIENT
Start: 2018-12-17 | End: 2018-12-17 | Stop reason: HOSPADM

## 2018-12-17 RX ORDER — PANTOPRAZOLE SODIUM 40 MG/1
40 TABLET, DELAYED RELEASE ORAL
Status: DISCONTINUED | OUTPATIENT
Start: 2018-12-18 | End: 2018-12-19

## 2018-12-17 RX ORDER — SODIUM CHLORIDE, SODIUM LACTATE, POTASSIUM CHLORIDE, CALCIUM CHLORIDE 600; 310; 30; 20 MG/100ML; MG/100ML; MG/100ML; MG/100ML
INJECTION, SOLUTION INTRAVENOUS CONTINUOUS
Status: DISCONTINUED | OUTPATIENT
Start: 2018-12-17 | End: 2018-12-19

## 2018-12-17 RX ORDER — METOCLOPRAMIDE HYDROCHLORIDE 5 MG/ML
10 INJECTION INTRAMUSCULAR; INTRAVENOUS EVERY 6 HOURS PRN
Status: DISCONTINUED | OUTPATIENT
Start: 2018-12-17 | End: 2018-12-19

## 2018-12-17 RX ORDER — MORPHINE SULFATE 4 MG/ML
4 INJECTION, SOLUTION INTRAMUSCULAR; INTRAVENOUS EVERY 10 MIN PRN
Status: DISCONTINUED | OUTPATIENT
Start: 2018-12-17 | End: 2018-12-17 | Stop reason: HOSPADM

## 2018-12-17 RX ORDER — SODIUM CHLORIDE, SODIUM LACTATE, POTASSIUM CHLORIDE, CALCIUM CHLORIDE 600; 310; 30; 20 MG/100ML; MG/100ML; MG/100ML; MG/100ML
INJECTION, SOLUTION INTRAVENOUS CONTINUOUS
Status: DISCONTINUED | OUTPATIENT
Start: 2018-12-17 | End: 2018-12-17 | Stop reason: HOSPADM

## 2018-12-17 RX ORDER — MORPHINE SULFATE 2 MG/ML
1 INJECTION, SOLUTION INTRAMUSCULAR; INTRAVENOUS EVERY 2 HOUR PRN
Status: DISCONTINUED | OUTPATIENT
Start: 2018-12-17 | End: 2018-12-19

## 2018-12-17 RX ORDER — LIDOCAINE HYDROCHLORIDE 10 MG/ML
INJECTION, SOLUTION EPIDURAL; INFILTRATION; INTRACAUDAL; PERINEURAL AS NEEDED
Status: DISCONTINUED | OUTPATIENT
Start: 2018-12-17 | End: 2018-12-17 | Stop reason: SURG

## 2018-12-17 RX ORDER — MIDAZOLAM HYDROCHLORIDE 1 MG/ML
INJECTION INTRAMUSCULAR; INTRAVENOUS AS NEEDED
Status: DISCONTINUED | OUTPATIENT
Start: 2018-12-17 | End: 2018-12-17 | Stop reason: SURG

## 2018-12-17 RX ORDER — ONDANSETRON 2 MG/ML
4 INJECTION INTRAMUSCULAR; INTRAVENOUS ONCE AS NEEDED
Status: COMPLETED | OUTPATIENT
Start: 2018-12-17 | End: 2018-12-17

## 2018-12-17 RX ORDER — ONDANSETRON 2 MG/ML
INJECTION INTRAMUSCULAR; INTRAVENOUS AS NEEDED
Status: DISCONTINUED | OUTPATIENT
Start: 2018-12-17 | End: 2018-12-17 | Stop reason: SURG

## 2018-12-17 RX ORDER — MORPHINE SULFATE 10 MG/ML
6 INJECTION, SOLUTION INTRAMUSCULAR; INTRAVENOUS EVERY 10 MIN PRN
Status: DISCONTINUED | OUTPATIENT
Start: 2018-12-17 | End: 2018-12-17 | Stop reason: HOSPADM

## 2018-12-17 RX ORDER — CEFAZOLIN SODIUM/WATER 2 G/20 ML
2 SYRINGE (ML) INTRAVENOUS EVERY 8 HOURS
Status: COMPLETED | OUTPATIENT
Start: 2018-12-17 | End: 2018-12-18

## 2018-12-17 RX ORDER — MORPHINE SULFATE 4 MG/ML
4 INJECTION, SOLUTION INTRAMUSCULAR; INTRAVENOUS EVERY 2 HOUR PRN
Status: DISCONTINUED | OUTPATIENT
Start: 2018-12-17 | End: 2018-12-19

## 2018-12-17 RX ORDER — FAMOTIDINE 20 MG/1
20 TABLET ORAL ONCE
Status: COMPLETED | OUTPATIENT
Start: 2018-12-17 | End: 2018-12-17

## 2018-12-17 RX ADMIN — ROCURONIUM BROMIDE 40 MG: 10 INJECTION, SOLUTION INTRAVENOUS at 11:06:00

## 2018-12-17 RX ADMIN — SODIUM CHLORIDE, SODIUM LACTATE, POTASSIUM CHLORIDE, CALCIUM CHLORIDE: 600; 310; 30; 20 INJECTION, SOLUTION INTRAVENOUS at 13:05:00

## 2018-12-17 RX ADMIN — SODIUM CHLORIDE, SODIUM LACTATE, POTASSIUM CHLORIDE, CALCIUM CHLORIDE: 600; 310; 30; 20 INJECTION, SOLUTION INTRAVENOUS at 12:33:00

## 2018-12-17 RX ADMIN — MIDAZOLAM HYDROCHLORIDE 2 MG: 1 INJECTION INTRAMUSCULAR; INTRAVENOUS at 10:52:00

## 2018-12-17 RX ADMIN — DEXAMETHASONE SODIUM PHOSPHATE 4 MG: 4 MG/ML VIAL (ML) INJECTION at 11:23:00

## 2018-12-17 RX ADMIN — ONDANSETRON 4 MG: 2 INJECTION INTRAMUSCULAR; INTRAVENOUS at 12:46:00

## 2018-12-17 RX ADMIN — ROCURONIUM BROMIDE 10 MG: 10 INJECTION, SOLUTION INTRAVENOUS at 12:12:00

## 2018-12-17 RX ADMIN — ROCURONIUM BROMIDE 10 MG: 10 INJECTION, SOLUTION INTRAVENOUS at 11:00:00

## 2018-12-17 RX ADMIN — GLYCOPYRROLATE 0.2 MG: 0.2 INJECTION INTRAMUSCULAR; INTRAVENOUS at 10:56:00

## 2018-12-17 RX ADMIN — LIDOCAINE HYDROCHLORIDE 50 MG: 10 INJECTION, SOLUTION EPIDURAL; INFILTRATION; INTRACAUDAL; PERINEURAL at 11:00:00

## 2018-12-17 RX ADMIN — ROCURONIUM BROMIDE 10 MG: 10 INJECTION, SOLUTION INTRAVENOUS at 11:23:00

## 2018-12-17 RX ADMIN — SODIUM CHLORIDE, SODIUM LACTATE, POTASSIUM CHLORIDE, CALCIUM CHLORIDE: 600; 310; 30; 20 INJECTION, SOLUTION INTRAVENOUS at 10:55:00

## 2018-12-17 NOTE — ANESTHESIA POSTPROCEDURE EVALUATION
Patient: Mallory Goel    Procedure Summary     Date:  12/17/18 Room / Location:  [address] [place] [address] MAIN OR 17 / [address] [place] [address] MAIN OR    Anesthesia Start:  1181 Anesthesia Stop:  1917    Procedure:  BARIATRIC GASTRIC BYPASS LAPAROSCOPIC WENDI-EN-Y (N/A ) Diagnosis:  (morbid obe

## 2018-12-17 NOTE — OPERATIVE REPORT
Antelmo Zaragoza / Artice Point  Sinan Houser / Dexter Mckeon / Yael Felipe / Donnell Story / Ray An - [phone]  Answering Service [phone]        Date: 12/17/2018  Preop Diagnosis: Morbid Obesity secondary to excess calories   Postop fashion on the table. A general anesthetic and the patient was intubated without incident. Pre-operative antibiotics were given. The abdomen was prepped and draped in the usual sterile fashion and a timeout was performed.     I gained access to the perito loads with reinforcement. The yoana limb was brought up without tension and an enterotomy made to allow access for a 25 EEA stapler. This was brought through the left lateral port site after first placing a wound protector.    The stapler easily went into

## 2018-12-17 NOTE — PROGRESS NOTES
[facility]    Progress Note    Wendy Leger Patient Status:  Surgery Admit    1975 MRN [id number]   Location [address] [facility] Attending Stefan Summers MD   Hosp Day # 0 PCP MD Siddharth Carrasco Anand hypertension  CONT HOME MEDS WHEN ABLE TO TOLERATE PO.              Results:     Lab Results   Component Value Date    WBC 7.0 08/28/2018    HGB 13.7 08/28/2018    HCT 40.5 08/28/2018     08/28/2018    CREATSERUM 0.66 08/28/2018    BUN 11 08/28/2018

## 2018-12-17 NOTE — ANESTHESIA PROCEDURE NOTES
ANESTHESIA INTUBATION  Date/Time: 12/17/2018 11:04 AM  Urgency: elective    Airway not difficult    General Information and Staff    Patient location during procedure: OR  Anesthesiologist: Pavan Harrsi MD  Resident/CRNA: Bari Araujo CRNA

## 2018-12-17 NOTE — H&P
Ivy Tong / Jorge Andrews / Alexus Hall / Lesly Reid / Dyan Griffith / Nyla [facility] - [phone]  Answering Service [phone]        Chon Connors Patient Status:  Surgery Admit    1975 MRN [id number]   [facility] Years since quittin.8      Smokeless tobacco: Never Used    Alcohol use: No      Comment: rarely     Family History   Problem Relation Age of Onset   • Heart Attack Father    • Diabetes Father    • Hypertension Father    • Hypertension Mother    • D wound infection, the need for dilatations of her gastrojejunostomy, and the inability to lose appropriate weight and keep it off. We discussed that our goal is to ameliorate her medical problems and not to obtain a specific body mass index.  She underst

## 2018-12-17 NOTE — ANESTHESIA PREPROCEDURE EVALUATION
Anesthesia PreOp Note    HPI:     Cedric Anderson is a 37year old female who presents for preoperative consultation requested by:  Samir Ricks MD    Date of Surgery: 12/17/2018    Procedure(s):  BARIATRIC GASTRIC BYPASS LAPAROSCOPIC WENDI-EN-Y  Indication: on file.       Radiology Contrast *    HIVES, ITCHING    Family History   Problem Relation Age of Onset   • Heart Attack Father    • Diabetes Father    • Hypertension Father    • Hypertension Mother    • Diabetes Mother    • Thyroid Disorder Mother    • Can Exam     Patient summary reviewed and Nursing notes reviewed    No history of anesthetic complications   Airway   Mallampati: II  Neck ROM: limited  Dental - normal exam     Pulmonary - negative ROS and normal exam   Cardiovascular - negative ROS and robert

## 2018-12-18 PROCEDURE — 99233 SBSQ HOSP IP/OBS HIGH 50: CPT | Performed by: HOSPITALIST

## 2018-12-18 NOTE — PROGRESS NOTES
[facility] - [facility]    Progress Note    Coletta Najjar Patient Status:  Surgery Admit    1975 MRN [id number]   Location [address] [facility] Attending Марина Skinner MD   Hosp Day # 1 PCP MD Uma Whitney

## 2018-12-18 NOTE — PROGRESS NOTES
Florian Workman / Jim Bolton / Carline Ferguson / Deidre Del Castillo / Jaclyn Flores / Rosalee Bethea - [phone]  Answering Service [phone]        Sunshine Forrest Patient Status:  Inpatient    1975 MRN [id number]   Location E the intraop findings of 2 abd wall hernias that we will likely need to address in the future.   - cont clear liquids  - increase activity  - dvt ppx  - likely home tmrw    Patricia Baires  12/18/2018

## 2018-12-18 NOTE — PROGRESS NOTES
Bariatric Inpatient Nutrition Note      Kelton Leigh is a 37year old female.     Procedure: Laparoscopic gastric bypass surgery  Surgery Date: 12.17.18  Medical Diagnosis: Morbid obesity    Height:  Ht Readings from Last 1 Encounters:  12/17/18 : 5' Once   [COMPLETED] famoTIDine (PEPCID) tab 20 mg 20 mg Oral Once   [COMPLETED] Metoclopramide HCl (REGLAN) tab 10 mg 10 mg Oral Once   [COMPLETED] Heparin Sodium (Porcine) 5000 UNIT/ML injection 5,000 Units 5,000 Units Subcutaneous Once   [COMPLETED] CeFAZ post-op    Monitor/Evaluate: Anthropometric measurements, Food/fluid intake/choices, Food intolerances, Activity level, Vitamin/mineral supplementation, Reinforce goals, Calorie/protein intake and f/u hx low Vit D.     Ashley Alvares, MPH, RD/LDN

## 2018-12-18 NOTE — PAYOR COMM NOTE
--------------  ADMISSION REVIEW     Payor: RAMONA ESPINOZA  Subscriber #:  [id number]  Authorization Number: [id number]    Admit date: 12/17/18  Admit time: 7433         REF:  [id number]   APPROVED 12/17/18- 12/18/18 REQUESTING ADDITIONAL DAY      12/18/18  Alvarado Date Action Dose Route     12/18/2018 1227 Given 2 g Intravenous     12/18/2018 0218 Given 2 g Intravenous     12/17/2018 1811 Given 2 g Intravenous       fentaNYL citrate (SUBLIMAZE) 0.05 MG/ML injection 50 mcg     Date Action Dose Route     12/17/2018 13

## 2018-12-19 ENCOUNTER — APPOINTMENT (OUTPATIENT)
Dept: NUCLEAR MEDICINE | Facility: HOSPITAL | Age: 43
DRG: 621 | End: 2018-12-19
Attending: HOSPITALIST
Payer: COMMERCIAL

## 2018-12-19 ENCOUNTER — APPOINTMENT (OUTPATIENT)
Dept: GENERAL RADIOLOGY | Facility: HOSPITAL | Age: 43
DRG: 621 | End: 2018-12-19
Attending: HOSPITALIST
Payer: COMMERCIAL

## 2018-12-19 VITALS
OXYGEN SATURATION: 98 % | HEART RATE: 89 BPM | DIASTOLIC BLOOD PRESSURE: 72 MMHG | WEIGHT: 293 LBS | TEMPERATURE: 98 F | HEIGHT: 62 IN | BODY MASS INDEX: 53.92 KG/M2 | SYSTOLIC BLOOD PRESSURE: 147 MMHG | RESPIRATION RATE: 18 BRPM

## 2018-12-19 PROCEDURE — 71046 X-RAY EXAM CHEST 2 VIEWS: CPT | Performed by: HOSPITALIST

## 2018-12-19 PROCEDURE — 99233 SBSQ HOSP IP/OBS HIGH 50: CPT | Performed by: HOSPITALIST

## 2018-12-19 PROCEDURE — 78582 LUNG VENTILAT&PERFUS IMAGING: CPT | Performed by: HOSPITALIST

## 2018-12-19 PROCEDURE — CB12VZZ PLANAR NUCLEAR MEDICINE IMAGING OF LUNGS AND BRONCHI USING XENON 133 (XE-133): ICD-10-PCS | Performed by: NUCLEAR MEDICINE

## 2018-12-19 RX ORDER — PANTOPRAZOLE SODIUM 40 MG/1
40 TABLET, DELAYED RELEASE ORAL
Qty: 30 TABLET | Refills: 2 | Status: SHIPPED | OUTPATIENT
Start: 2018-12-20 | End: 2019-05-07

## 2018-12-19 RX ORDER — DIPHENHYDRAMINE HYDROCHLORIDE, ZINC ACETATE 2; .1 G/100G; G/100G
1 CREAM TOPICAL 3 TIMES DAILY PRN
Status: DISCONTINUED | OUTPATIENT
Start: 2018-12-19 | End: 2018-12-19

## 2018-12-19 RX ORDER — PANTOPRAZOLE SODIUM 40 MG/1
40 TABLET, DELAYED RELEASE ORAL
Qty: 30 TABLET | Refills: 0 | Status: SHIPPED | OUTPATIENT
Start: 2018-12-20 | End: 2018-12-19

## 2018-12-19 NOTE — DISCHARGE SUMMARY
[facility]    Discharge Summary    Ramon Wynn Patient Status:  Inpatient    1975 MRN [id number]   Location [facility] 4W/SW/SE Attending Candido Vinson MD   Hosp Day # 2 PCP Dennis Cortes MD     Date of Admission: evidence for an acute pulmonary embolism. The decreased tracer the left lung field could be secondary to the pleural effusion.      Dictated by (CST): Taylor Monk MD on 12/19/2018 at 12:22     Approved by (CST): Taylor Monk MD on 12/19/2018 a taking these medications    lisinopril 30 MG Tabs  Commonly known as:  PRINIVIL,ZESTRIL              Where to Get Your Medications      These medications were sent to [address], [place] - [address] [facility]  [facility]

## 2018-12-19 NOTE — PLAN OF CARE
CARDIOVASCULAR - ADULT    • Maintains optimal cardiac output and hemodynamic stability Progressing    • Absence of cardiac arrhythmias or at baseline Progressing    Telemetry monitoring in place.        DISCHARGE PLANNING    • Discharge to home or other fac

## 2018-12-19 NOTE — PROGRESS NOTES
[facility]    Progress Note    Avi Katz Patient Status:  Inpatient    1975 MRN [id number]   Location [facility] 4W/SW/SE Attending Jeovanny Rhodes MD   Hosp Day # 2 PCP Tamar Jason MD       Subjective:   Alexandre Perry morphINE sulfate **OR** morphINE sulfate, ondansetron HCl, Metoclopramide HCl    Results:     Lab Results   Component Value Date    WBC 12.8 (H) 12/18/2018    HGB 12.5 12/18/2018    HCT 36.7 12/18/2018     12/18/2018    CREATSERUM 0.68 12/18/2018 MD

## 2018-12-19 NOTE — PROGRESS NOTES
Ezio Holley / Clara Woo / Adriane Dockery / Jasmin Marinelli / Karen Salinas / Zackary Bassett - [phone]  [facility] Service [phone]        Mercy Ardons Patient Status:  Inpatient    1975 MRN [id number]   Location E rhythm  Abdomen: soft, appropriately tender, non-distended, incisions healing well, no sign of infection  Extremities: normal strength, normal ROM, walks without assist device  Neuro: no focal deficits, cranial nerves grossly intact  Psych: alert and orien

## 2018-12-19 NOTE — PAYOR COMM NOTE
REF: REF: [id number] APPROVED 12/17/18- 12/18/18, UPDATE FOR 12/18/18 FAXED ON 12/18/18- REQUESTING ADDITIONAL DAYS      12/19/18  Subjective:   Bernell Bumpers is c/o chest pressure / tightness - started after surgery, feels about the same possibly worse Recent Labs   Lab  12/18/18   0432   RBC  4.14   HGB  12.5   HCT  36.7   MCV  88.7   MCH  30.2   MCHC  34.0   RDW  12.4   WBC  12.8*   PLT  203          Recent Labs   Lab  12/18/18 0432   GLU  119*   BUN  8   CREATSERUM  0.68   GFRAA  >60   GFRNA Intravenous     12/18/2018 1541 Given 30 mg Intravenous       lactated ringers infusion     Date Action Dose Route     12/19/2018 0359 New Bag (none) Intravenous     12/18/2018 2127 New Bag (none) Intravenous       lactated ringers infusion     Date Action

## 2018-12-21 ENCOUNTER — TELEPHONE (OUTPATIENT)
Dept: SURGERY | Facility: CLINIC | Age: 43
End: 2018-12-21

## 2018-12-26 ENCOUNTER — TELEPHONE (OUTPATIENT)
Dept: SURGERY | Facility: CLINIC | Age: 43
End: 2018-12-26

## 2018-12-26 ENCOUNTER — OFFICE VISIT (OUTPATIENT)
Dept: SURGERY | Facility: CLINIC | Age: 43
End: 2018-12-26
Payer: COMMERCIAL

## 2018-12-26 VITALS
WEIGHT: 293 LBS | DIASTOLIC BLOOD PRESSURE: 92 MMHG | HEIGHT: 61 IN | HEART RATE: 87 BPM | SYSTOLIC BLOOD PRESSURE: 143 MMHG | BODY MASS INDEX: 55.32 KG/M2

## 2018-12-26 DIAGNOSIS — E66.01 MORBID OBESITY WITH BMI OF 50.0-59.9, ADULT (HCC): ICD-10-CM

## 2018-12-26 DIAGNOSIS — I10 ESSENTIAL HYPERTENSION: Primary | ICD-10-CM

## 2018-12-26 PROBLEM — Z98.84 S/P GASTRIC BYPASS: Status: ACTIVE | Noted: 2018-12-26

## 2018-12-26 NOTE — TELEPHONE ENCOUNTER
Seen patient in office for first post op visit. to discuss discharge care. Reviewed all post-op instructions. *Covered fluid intake of 64 oz/day and to call if not meeting at 30-40 oz/day.   *Monitor incision for any redness, drainage, swelling and/o

## 2018-12-26 NOTE — PROGRESS NOTES
[address], [address] [address]  Dept: [phone]    12/26/2018   Bariatric Patient Post-op Evaluation    Chief Complaint:  Morbid obesity, RYGB 12/17, preop 317lbs Years: 10.00        Pack years: 5        Types: Cigarettes        Quit date: 2011        Years since quittin.9      Smokeless tobacco: Never Used    Substance and Sexual Activity      Alcohol use: No        Comment: rarely      Drug use: No      M post-op schedule  Cont fluid and protein goals  Cont PPI for 3 months  Exercise - walking ok, may gradually advance activity in 1 week, no restrictions at 6 weeks, note written for return to gym 1/28/19  Follow-up in 4 weeks for routine checkup    Armond Benavidez

## 2019-01-10 ENCOUNTER — OFFICE VISIT (OUTPATIENT)
Dept: SURGERY | Facility: CLINIC | Age: 44
End: 2019-01-10
Payer: COMMERCIAL

## 2019-01-10 VITALS
SYSTOLIC BLOOD PRESSURE: 145 MMHG | BODY MASS INDEX: 55.32 KG/M2 | WEIGHT: 293 LBS | HEIGHT: 61 IN | HEART RATE: 78 BPM | DIASTOLIC BLOOD PRESSURE: 94 MMHG | OXYGEN SATURATION: 100 % | RESPIRATION RATE: 16 BRPM

## 2019-01-10 DIAGNOSIS — I10 ESSENTIAL HYPERTENSION: Primary | ICD-10-CM

## 2019-01-10 DIAGNOSIS — Z98.84 S/P GASTRIC BYPASS: ICD-10-CM

## 2019-01-10 DIAGNOSIS — E66.01 MORBID OBESITY WITH BMI OF 50.0-59.9, ADULT (HCC): ICD-10-CM

## 2019-01-10 DIAGNOSIS — R60.0 LOWER EXTREMITY EDEMA: ICD-10-CM

## 2019-01-10 PROCEDURE — 99214 OFFICE O/P EST MOD 30 MIN: CPT | Performed by: INTERNAL MEDICINE

## 2019-01-10 NOTE — PROGRESS NOTES
[address], [address] [address]  Dept: [phone]    Date: 1/10/2019    Patient:  Flakita Gustafson  :      1975  MRN:      [id number]    Referring Provider: Li Willams Date   • BARIATRIC GASTRIC BYPASS LAPAROSCOPIC WENDI-EN-Y N/A 2018    Performed by Milly Xie MD at [address] [place] [address] Dr   •      • CHOLECYSTECTOMY     • GASTRIC BYPASS,OBESE<100CM WENDI-EN-Y  2018    Dr Alfred Arredondo, [facility] AND CL[facility]ICS   • OTHER      l bp    Keep moving    Follow up with RD and surgeon as scheduled    Blood work due in March Ok to return to work      Wilman Sparks MD

## 2019-01-14 ENCOUNTER — TELEPHONE (OUTPATIENT)
Dept: SURGERY | Facility: CLINIC | Age: 44
End: 2019-01-14

## 2019-01-14 NOTE — TELEPHONE ENCOUNTER
1/7/19 @ 10:47am  Spoke to Christie at [facility], [phone], ZRY#[id number]. She verified that patient has following benefits for Bariatric services:   • No weight management criteria. • No SHYAM/Blue Distinction required.    • FRANCIV ([facility]#[id number]) DX E66.01   -

## 2019-01-18 ENCOUNTER — OFFICE VISIT (OUTPATIENT)
Dept: FAMILY MEDICINE CLINIC | Facility: CLINIC | Age: 44
End: 2019-01-18
Payer: COMMERCIAL

## 2019-01-18 VITALS
WEIGHT: 293 LBS | DIASTOLIC BLOOD PRESSURE: 81 MMHG | HEIGHT: 61 IN | BODY MASS INDEX: 55.32 KG/M2 | HEART RATE: 74 BPM | TEMPERATURE: 98 F | SYSTOLIC BLOOD PRESSURE: 136 MMHG

## 2019-01-18 DIAGNOSIS — Z09 HOSPITAL DISCHARGE FOLLOW-UP: Primary | ICD-10-CM

## 2019-01-18 PROCEDURE — 99213 OFFICE O/P EST LOW 20 MIN: CPT | Performed by: FAMILY MEDICINE

## 2019-01-18 PROCEDURE — 99212 OFFICE O/P EST SF 10 MIN: CPT | Performed by: FAMILY MEDICINE

## 2019-01-18 RX ORDER — LISINOPRIL 10 MG/1
10 TABLET ORAL DAILY
Qty: 90 TABLET | Refills: 0 | Status: SHIPPED | OUTPATIENT
Start: 2019-01-18 | End: 2019-02-13

## 2019-01-22 ENCOUNTER — OFFICE VISIT (OUTPATIENT)
Dept: SURGERY | Facility: CLINIC | Age: 44
End: 2019-01-22
Payer: COMMERCIAL

## 2019-01-22 VITALS — BODY MASS INDEX: 53.27 KG/M2 | HEIGHT: 62 IN | WEIGHT: 289.5 LBS

## 2019-01-22 DIAGNOSIS — E66.01 MORBID OBESITY WITH BMI OF 50.0-59.9, ADULT (HCC): Primary | ICD-10-CM

## 2019-01-22 PROCEDURE — 97803 MED NUTRITION INDIV SUBSEQ: CPT | Performed by: DIETITIAN, REGISTERED

## 2019-01-22 NOTE — PROGRESS NOTES
[address]  [address] [address]  Dept: [phone]  Loc: [phone]    01/22/19      Bariatric Follow-up Nutrition Session    Jonny Waters is a 37year old female.      Ass Vitamins/Minerals:  Lab Results   Component Value Date    B12 367 10/19/2018     Lab Results   Component Value Date    SLJN52EV 19.9 (L) 10/19/2018     Lab Results   Component Value Date/Time    THIAMINE 151 10/19/2018 02:52 PM      No results found fo for possible change in HTN medication dosage, also encouraged more fluid and protein via supplements. From diet recall only eating small amounts of solid foods since can't tolerate milk-based shakes. Some nausea possibly due to dehydration.  Reviewed handou

## 2019-01-23 ENCOUNTER — TELEPHONE (OUTPATIENT)
Dept: SURGERY | Facility: CLINIC | Age: 44
End: 2019-01-23

## 2019-01-23 ENCOUNTER — OFFICE VISIT (OUTPATIENT)
Dept: SURGERY | Facility: CLINIC | Age: 44
End: 2019-01-23
Payer: COMMERCIAL

## 2019-01-23 ENCOUNTER — HOSPITAL ENCOUNTER (OUTPATIENT)
Facility: HOSPITAL | Age: 44
Discharge: HOME | End: 2019-01-23
Attending: HOSPITALIST | Admitting: HOSPITALIST
Payer: COMMERCIAL

## 2019-01-23 VITALS
HEART RATE: 71 BPM | SYSTOLIC BLOOD PRESSURE: 136 MMHG | HEIGHT: 61 IN | OXYGEN SATURATION: 98 % | TEMPERATURE: 99 F | BODY MASS INDEX: 55 KG/M2 | DIASTOLIC BLOOD PRESSURE: 89 MMHG | RESPIRATION RATE: 15 BRPM

## 2019-01-23 VITALS
HEIGHT: 61 IN | DIASTOLIC BLOOD PRESSURE: 84 MMHG | BODY MASS INDEX: 54.56 KG/M2 | WEIGHT: 289 LBS | SYSTOLIC BLOOD PRESSURE: 137 MMHG | RESPIRATION RATE: 16 BRPM

## 2019-01-23 DIAGNOSIS — E66.01 MORBID OBESITY WITH BMI OF 50.0-59.9, ADULT (HCC): Primary | ICD-10-CM

## 2019-01-23 DIAGNOSIS — Z98.84 S/P GASTRIC BYPASS: ICD-10-CM

## 2019-01-23 DIAGNOSIS — E86.0 DEHYDRATION: ICD-10-CM

## 2019-01-23 PROCEDURE — 3E0H37Z INTRODUCTION OF ELECTROLYTIC AND WATER BALANCE SUBSTANCE INTO LOWER GI, PERCUTANEOUS APPROACH: ICD-10-PCS | Performed by: SINGLE SPECIALTY

## 2019-01-23 RX ORDER — SODIUM CHLORIDE 9 MG/ML
INJECTION, SOLUTION INTRAVENOUS
Status: DISCONTINUED
Start: 2019-01-23 | End: 2019-01-23

## 2019-01-23 NOTE — PROGRESS NOTES
[address], [address] [address]  Dept: [phone]    1/23/2019   Bariatric Patient Post-op Evaluation    Chief Complaint:  Morbid obesity, RYGB 12/17, preop 317lbs on file      Number of children: Not on file      Years of education: Not on file      Highest education level: Not on file    Tobacco Use      Smoking status: Former Smoker        Packs/day: 0.50        Years: 10.00        Pack years: 5        Types: Ciga oriented, normal affect  Skin: warm, dry    Assessment: 37year old female who is s/p lap RYGB, she seems to have gotten dehydrated by drinking too fast and not keeping enough down.   She tolerates food very well which argues against stricture or marginal u

## 2019-01-24 NOTE — PROGRESS NOTES
HPI:    Patient ID: Lul Cao is a 37year old female. Patient here for f/u gastric bypass surgery. Doing well. States that already lost around 14 lbs and doing well. She needs also note to go back to work.  Her job is mostly sedentery          Rev

## 2019-02-13 ENCOUNTER — OFFICE VISIT (OUTPATIENT)
Dept: SURGERY | Facility: CLINIC | Age: 44
End: 2019-02-13
Payer: COMMERCIAL

## 2019-02-13 VITALS
DIASTOLIC BLOOD PRESSURE: 80 MMHG | WEIGHT: 278 LBS | SYSTOLIC BLOOD PRESSURE: 120 MMHG | BODY MASS INDEX: 52.49 KG/M2 | HEIGHT: 61 IN

## 2019-02-13 DIAGNOSIS — Z98.84 S/P GASTRIC BYPASS: Primary | ICD-10-CM

## 2019-02-13 RX ORDER — LISINOPRIL 10 MG/1
TABLET ORAL
Qty: 90 TABLET | Refills: 0 | Status: SHIPPED | OUTPATIENT
Start: 2019-02-13 | End: 2019-05-07

## 2019-02-13 NOTE — PROGRESS NOTES
[address], [address] [address]  Dept: [phone]    2/13/2019   Bariatric Patient Post-op Evaluation    Chief Complaint:  Morbid obesity, RYGB 12/17/18, preop 317lb file      Number of children: Not on file      Years of education: Not on file      Highest education level: Not on file    Tobacco Use      Smoking status: Former Smoker        Packs/day: 0.50        Years: 10.00        Pack years: 5        Types: Cigaret and oriented, normal affect  Skin: warm, dry    Assessment: 37year old female who is s/p lap RYGB, doing reasonably well. Doesn't seem to have trouble drinking when she is active, only when sitting around at home.     Plan:   No need for infusion center t

## 2019-03-05 ENCOUNTER — OFFICE VISIT (OUTPATIENT)
Dept: FAMILY MEDICINE CLINIC | Facility: CLINIC | Age: 44
End: 2019-03-05
Payer: COMMERCIAL

## 2019-03-05 VITALS
HEIGHT: 61 IN | SYSTOLIC BLOOD PRESSURE: 137 MMHG | HEART RATE: 65 BPM | WEIGHT: 274 LBS | DIASTOLIC BLOOD PRESSURE: 88 MMHG | TEMPERATURE: 98 F | BODY MASS INDEX: 51.73 KG/M2

## 2019-03-05 DIAGNOSIS — J06.9 UPPER RESPIRATORY INFECTION, ACUTE: Primary | ICD-10-CM

## 2019-03-05 PROCEDURE — 99212 OFFICE O/P EST SF 10 MIN: CPT | Performed by: FAMILY MEDICINE

## 2019-03-05 PROCEDURE — 99213 OFFICE O/P EST LOW 20 MIN: CPT | Performed by: FAMILY MEDICINE

## 2019-03-05 RX ORDER — AZITHROMYCIN 250 MG/1
TABLET, FILM COATED ORAL
Qty: 6 TABLET | Refills: 0 | Status: SHIPPED | OUTPATIENT
Start: 2019-03-05 | End: 2019-05-07

## 2019-03-05 NOTE — PROGRESS NOTES
HPI:    Patient ID: Antonieta Osman is a 37year old female. Congestion cough green phlegm, no fever. Review of Systems   Constitutional: Positive for fatigue.  Negative for appetite change, chills, diaphoresis, fever and unexpected weight change

## 2019-03-06 ENCOUNTER — OFFICE VISIT (OUTPATIENT)
Dept: SURGERY | Facility: CLINIC | Age: 44
End: 2019-03-06
Payer: COMMERCIAL

## 2019-03-06 VITALS
HEIGHT: 61 IN | SYSTOLIC BLOOD PRESSURE: 100 MMHG | BODY MASS INDEX: 51.35 KG/M2 | WEIGHT: 272 LBS | DIASTOLIC BLOOD PRESSURE: 70 MMHG

## 2019-03-06 DIAGNOSIS — I10 ESSENTIAL HYPERTENSION: Primary | ICD-10-CM

## 2019-03-06 DIAGNOSIS — L83 ACANTHOSIS NIGRICANS: ICD-10-CM

## 2019-03-06 DIAGNOSIS — E55.9 VITAMIN D DEFICIENCY: ICD-10-CM

## 2019-03-06 DIAGNOSIS — Z98.84 S/P GASTRIC BYPASS: ICD-10-CM

## 2019-03-06 DIAGNOSIS — E66.01 MORBID OBESITY WITH BMI OF 50.0-59.9, ADULT (HCC): ICD-10-CM

## 2019-03-06 DIAGNOSIS — L30.4 INTERTRIGO: ICD-10-CM

## 2019-03-06 DIAGNOSIS — E53.8 LOW SERUM VITAMIN B12: ICD-10-CM

## 2019-03-06 PROCEDURE — 99214 OFFICE O/P EST MOD 30 MIN: CPT | Performed by: INTERNAL MEDICINE

## 2019-03-06 NOTE — PROGRESS NOTES
[address], [address]  Dept: [phone]        Patient:  Avi Katz  :      1975  MRN:      [id number]    Referring Provider: Tamar Jason MD Drug use: No    Surgical History:    Past Surgical History:   Procedure Laterality Date   • BARIATRIC GASTRIC BYPASS LAPAROSCOPIC WENDI  N-Y N/A 2018    Performed by Ed Beyer MD at [address]   •      • CHOLECYSTECTOMY     • GASTRIC BYPA en Y 12/17/2018    Feels well    Continue vitamins    Will observe bp    Keep moving    Follow up with RD and surgeon as scheduled    Blood work due     Intertrigo: keep areas under pannus dry  Increased back pain noted:    May benefit from panniculectomy

## 2019-03-21 ENCOUNTER — OFFICE VISIT (OUTPATIENT)
Dept: SURGERY | Facility: CLINIC | Age: 44
End: 2019-03-21
Payer: COMMERCIAL

## 2019-03-21 ENCOUNTER — APPOINTMENT (OUTPATIENT)
Dept: LAB | Facility: HOSPITAL | Age: 44
End: 2019-03-21
Attending: INTERNAL MEDICINE
Payer: COMMERCIAL

## 2019-03-21 VITALS — BODY MASS INDEX: 50.81 KG/M2 | WEIGHT: 269.13 LBS | HEIGHT: 61 IN

## 2019-03-21 DIAGNOSIS — Z98.84 S/P GASTRIC BYPASS: ICD-10-CM

## 2019-03-21 DIAGNOSIS — I10 ESSENTIAL HYPERTENSION: ICD-10-CM

## 2019-03-21 DIAGNOSIS — E53.8 LOW SERUM VITAMIN B12: ICD-10-CM

## 2019-03-21 DIAGNOSIS — L83 ACANTHOSIS NIGRICANS: ICD-10-CM

## 2019-03-21 DIAGNOSIS — E66.01 MORBID OBESITY WITH BMI OF 50.0-59.9, ADULT (HCC): ICD-10-CM

## 2019-03-21 DIAGNOSIS — E55.9 VITAMIN D DEFICIENCY: ICD-10-CM

## 2019-03-21 DIAGNOSIS — E66.01 MORBID OBESITY WITH BMI OF 50.0-59.9, ADULT (HCC): Primary | ICD-10-CM

## 2019-03-21 LAB
ALBUMIN SERPL-MCNC: 3.2 G/DL (ref 3.4–5)
ALBUMIN/GLOB SERPL: 0.8 {RATIO} (ref 1–2)
ALP LIVER SERPL-CCNC: 64 U/L (ref 37–98)
ALT SERPL-CCNC: 52 U/L (ref 13–56)
ANION GAP SERPL CALC-SCNC: 4 MMOL/L (ref 0–18)
AST SERPL-CCNC: 61 U/L (ref 15–37)
BILIRUB SERPL-MCNC: 1.1 MG/DL (ref 0.1–2)
BUN BLD-MCNC: 9 MG/DL (ref 7–18)
BUN/CREAT SERPL: 14.8 (ref 10–20)
CALCIUM BLD-MCNC: 9.6 MG/DL (ref 8.5–10.1)
CHLORIDE SERPL-SCNC: 110 MMOL/L (ref 98–107)
CHOLEST SMN-MCNC: 171 MG/DL (ref ?–200)
CO2 SERPL-SCNC: 29 MMOL/L (ref 21–32)
CREAT BLD-MCNC: 0.61 MG/DL (ref 0.55–1.02)
DEPRECATED RDW RBC AUTO: 43.5 FL (ref 35.1–46.3)
ERYTHROCYTE [DISTWIDTH] IN BLOOD BY AUTOMATED COUNT: 13.3 % (ref 11–15)
GLOBULIN PLAS-MCNC: 3.9 G/DL (ref 2.8–4.4)
GLUCOSE BLD-MCNC: 96 MG/DL (ref 70–99)
HCT VFR BLD AUTO: 42.7 % (ref 35–48)
HDLC SERPL-MCNC: 36 MG/DL (ref 40–59)
HGB BLD-MCNC: 14.1 G/DL (ref 12–16)
LDLC SERPL CALC-MCNC: 111 MG/DL (ref ?–100)
M PROTEIN MFR SERPL ELPH: 7.1 G/DL (ref 6.4–8.2)
MCH RBC QN AUTO: 29.5 PG (ref 26–34)
MCHC RBC AUTO-ENTMCNC: 33 G/DL (ref 31–37)
MCV RBC AUTO: 89.3 FL (ref 80–100)
NONHDLC SERPL-MCNC: 135 MG/DL (ref ?–130)
OSMOLALITY SERPL CALC.SUM OF ELEC: 295 MOSM/KG (ref 275–295)
PLATELET # BLD AUTO: 176 10(3)UL (ref 150–450)
POTASSIUM SERPL-SCNC: 3.5 MMOL/L (ref 3.5–5.1)
RBC # BLD AUTO: 4.78 X10(6)UL (ref 3.8–5.3)
SODIUM SERPL-SCNC: 143 MMOL/L (ref 136–145)
TRIGL SERPL-MCNC: 122 MG/DL (ref 30–149)
TSI SER-ACNC: 1.36 MIU/ML (ref 0.36–3.74)
VIT B12 SERPL-MCNC: 591 PG/ML (ref 193–986)
VLDLC SERPL CALC-MCNC: 24 MG/DL (ref 0–30)
WBC # BLD AUTO: 6.3 X10(3) UL (ref 4–11)

## 2019-03-21 PROCEDURE — 84425 ASSAY OF VITAMIN B-1: CPT

## 2019-03-21 PROCEDURE — 85027 COMPLETE CBC AUTOMATED: CPT

## 2019-03-21 PROCEDURE — 0358T BIA WHOLE BODY: CPT | Performed by: DIETITIAN, REGISTERED

## 2019-03-21 PROCEDURE — 82607 VITAMIN B-12: CPT

## 2019-03-21 PROCEDURE — 80061 LIPID PANEL: CPT

## 2019-03-21 PROCEDURE — 36415 COLL VENOUS BLD VENIPUNCTURE: CPT

## 2019-03-21 PROCEDURE — 84443 ASSAY THYROID STIM HORMONE: CPT

## 2019-03-21 PROCEDURE — 97803 MED NUTRITION INDIV SUBSEQ: CPT | Performed by: DIETITIAN, REGISTERED

## 2019-03-21 PROCEDURE — 80053 COMPREHEN METABOLIC PANEL: CPT

## 2019-03-21 PROCEDURE — 82306 VITAMIN D 25 HYDROXY: CPT

## 2019-03-21 NOTE — PROGRESS NOTES
[address]  [address] [address]  Dept: [phone]  Loc: [phone]    03/21/19      Bariatric Follow-up Nutrition Session    Mercy Victor is a 37year old female.      Ass 03/21/2019    CALCNONHDL 147 (H) 06/28/2016        Vitamins/Minerals:  Lab Results   Component Value Date    B12 591 03/21/2019     Lab Results   Component Value Date    CMWV88QD 19.9 (L) 10/19/2018     Lab Results   Component Value Date/Time    THIAMINE 1 Level: active  · Type: walk, vani and   · Duration: 60 minutes  · Frequency: 5 x week total    Other:  More active with classes and walking (up to 7,000 steps/day from 5,000/day previously).  Redid body comp which showed 55-lb wt loss, with 13.5 Lbs

## 2019-03-22 ENCOUNTER — TELEPHONE (OUTPATIENT)
Dept: SURGERY | Facility: CLINIC | Age: 44
End: 2019-03-22

## 2019-03-22 LAB — 25(OH)D3 SERPL-MCNC: 25.9 NG/ML (ref 30–100)

## 2019-03-22 NOTE — TELEPHONE ENCOUNTER
Discussed lab results with patient, FBS and LFTs improved. No significant abnormalities otherwise in CMP, blood counts, TSH.  B12 in range. Lipid panel remains elevated. Discussed importance of increasing protein. Thiamine pending.      Patient confir

## 2019-03-24 LAB — VITAMIN B1 (THIAMINE), WHOLE B: 120 NMOL/L

## 2019-03-25 NOTE — TELEPHONE ENCOUNTER
Informed patient thiamine level in range. Continue bariatric vitamin and plan of care. All questions answered.

## 2019-05-07 ENCOUNTER — TELEPHONE (OUTPATIENT)
Dept: FAMILY MEDICINE CLINIC | Facility: CLINIC | Age: 44
End: 2019-05-07

## 2019-05-07 ENCOUNTER — OFFICE VISIT (OUTPATIENT)
Dept: FAMILY MEDICINE CLINIC | Facility: CLINIC | Age: 44
End: 2019-05-07
Payer: COMMERCIAL

## 2019-05-07 VITALS
BODY MASS INDEX: 48.71 KG/M2 | HEART RATE: 69 BPM | TEMPERATURE: 97 F | HEIGHT: 61 IN | DIASTOLIC BLOOD PRESSURE: 82 MMHG | SYSTOLIC BLOOD PRESSURE: 138 MMHG | WEIGHT: 258 LBS

## 2019-05-07 DIAGNOSIS — R10.32 LEFT LOWER QUADRANT PAIN: Primary | ICD-10-CM

## 2019-05-07 PROCEDURE — 99213 OFFICE O/P EST LOW 20 MIN: CPT | Performed by: PHYSICIAN ASSISTANT

## 2019-05-07 PROCEDURE — 99212 OFFICE O/P EST SF 10 MIN: CPT | Performed by: PHYSICIAN ASSISTANT

## 2019-05-07 NOTE — PROGRESS NOTES
HPI:    Patient ID: Cedric Anderson is a 37year old female. Patient presents for lower left abdominal pain for the past 4 days. Pt did motorcycle class last Tuesday to Thursday.  The lower left region was uncomfortable after the class and now has become Neck: Normal range of motion. Neck supple. Cardiovascular: Normal rate, regular rhythm and normal heart sounds. Pulmonary/Chest: Effort normal and breath sounds normal.   Abdominal: Soft.  Bowel sounds are normal. She exhibits no distension and no ma

## 2019-05-07 NOTE — TELEPHONE ENCOUNTER
Talked to pt. Pt has CT scan scheduled for this Saturday which is appropriate. Advised pt that if abdominal pain increases, she starts vomiting, having fever/chills, diarrhea or something else changes, she must go to ER. Pt expressed understanding.  She mik

## 2019-05-07 NOTE — TELEPHONE ENCOUNTER
Message noted. This was a regular CT scan that was ordered. I advised pt to see if she can get it done today, if not in the next couple of days. This was to evaluate her the status of her hernias.

## 2019-05-07 NOTE — TELEPHONE ENCOUNTER
Pt is waiting at the center. Delmer Bell is calling in regards to order pt was sent as a STAT per PSR unable to get her in today since not authorized or order is not  a STAT. Please call Tyron Gibbs to see if pt can do test today as a STAT.

## 2019-05-11 ENCOUNTER — HOSPITAL ENCOUNTER (OUTPATIENT)
Dept: CT IMAGING | Age: 44
Discharge: HOME | End: 2019-05-11
Attending: PHYSICIAN ASSISTANT
Payer: COMMERCIAL

## 2019-05-11 DIAGNOSIS — R10.32 LEFT LOWER QUADRANT PAIN: ICD-10-CM

## 2019-05-11 PROCEDURE — 74176 CT ABD & PELVIS W/O CONTRAST: CPT | Performed by: PHYSICIAN ASSISTANT

## 2019-05-15 ENCOUNTER — OFFICE VISIT (OUTPATIENT)
Dept: SURGERY | Facility: CLINIC | Age: 44
End: 2019-05-15
Payer: COMMERCIAL

## 2019-05-15 VITALS
HEIGHT: 61 IN | BODY MASS INDEX: 47.95 KG/M2 | SYSTOLIC BLOOD PRESSURE: 143 MMHG | DIASTOLIC BLOOD PRESSURE: 77 MMHG | HEART RATE: 58 BPM | WEIGHT: 254 LBS | OXYGEN SATURATION: 100 %

## 2019-05-15 DIAGNOSIS — K43.2 VENTRAL INCISIONAL HERNIA: Primary | ICD-10-CM

## 2019-05-15 DIAGNOSIS — Z01.818 PREOP TESTING: ICD-10-CM

## 2019-05-15 DIAGNOSIS — Z98.84 S/P GASTRIC BYPASS: ICD-10-CM

## 2019-05-15 NOTE — PROGRESS NOTES
[address], [address] [address]  Dept: [phone]    5/15/2019   Bariatric Patient Post-op Evaluation    Chief Complaint:  Morbid obesity, RYGB 12/17/18, preop 317lb Use      Smoking status: Former Smoker        Packs/day: 0.50        Years: 10.00        Pack years: 5        Types: Cigarettes        Quit date: 2011        Years since quittin.2      Smokeless tobacco: Never Used    Substance and Sexual Activity painful so although I would have preferred to wait until she has lost more weight to lessen the risk of infection or recurrence, a repair is indicated now.   I recommended a laparoscopic approach and suggested we may be able to take care of her umbilical he

## 2019-05-16 ENCOUNTER — TELEPHONE (OUTPATIENT)
Dept: SURGERY | Facility: CLINIC | Age: 44
End: 2019-05-16

## 2019-05-16 DIAGNOSIS — I10 ESSENTIAL HYPERTENSION: Primary | ICD-10-CM

## 2019-05-16 DIAGNOSIS — R60.0 LOWER EXTREMITY EDEMA: ICD-10-CM

## 2019-05-16 DIAGNOSIS — E66.01 MORBID OBESITY WITH BMI OF 50.0-59.9, ADULT (HCC): ICD-10-CM

## 2019-05-16 DIAGNOSIS — L83 ACANTHOSIS NIGRICANS: ICD-10-CM

## 2019-05-16 DIAGNOSIS — Z98.84 S/P GASTRIC BYPASS: ICD-10-CM

## 2019-05-16 DIAGNOSIS — E55.9 VITAMIN D DEFICIENCY: ICD-10-CM

## 2019-05-16 RX ORDER — ERGOCALCIFEROL (VITAMIN D2) 1250 MCG
50000 CAPSULE ORAL WEEKLY
Qty: 30 CAPSULE | Refills: 0 | Status: SHIPPED | OUTPATIENT
Start: 2019-05-16 | End: 2019-06-17

## 2019-05-17 RX ORDER — BIOTIN 2500 MCG
1 CAPSULE ORAL DAILY
COMMUNITY

## 2019-05-17 RX ORDER — CALCIUM CARBONATE 200(500)MG
1 TABLET,CHEWABLE ORAL DAILY
COMMUNITY
End: 2019-09-23 | Stop reason: ALTCHOICE

## 2019-05-20 ENCOUNTER — ANESTHESIA (OUTPATIENT)
Dept: SURGERY | Facility: HOSPITAL | Age: 44
End: 2019-05-20
Payer: COMMERCIAL

## 2019-05-20 ENCOUNTER — HOSPITAL ENCOUNTER (OUTPATIENT)
Facility: HOSPITAL | Age: 44
Discharge: HOME | End: 2019-05-21
Attending: SINGLE SPECIALTY | Admitting: SINGLE SPECIALTY
Payer: COMMERCIAL

## 2019-05-20 ENCOUNTER — ANESTHESIA EVENT (OUTPATIENT)
Dept: SURGERY | Facility: HOSPITAL | Age: 44
End: 2019-05-20
Payer: COMMERCIAL

## 2019-05-20 PROBLEM — K42.9 UMBILICAL HERNIA: Status: ACTIVE | Noted: 2019-05-20

## 2019-05-20 PROCEDURE — 99225 SUBSEQUENT OBSERVATION CARE: CPT | Performed by: HOSPITALIST

## 2019-05-20 PROCEDURE — 0WUF4JZ SUPPLEMENT ABDOMINAL WALL WITH SYNTHETIC SUBSTITUTE, PERCUTANEOUS ENDOSCOPIC APPROACH: ICD-10-PCS | Performed by: SINGLE SPECIALTY

## 2019-05-20 DEVICE — VENTRALEX ST HERNIA PATCH
Type: IMPLANTABLE DEVICE | Site: ABDOMEN | Status: FUNCTIONAL
Brand: VENTRALEX ST HERNIA PATCH

## 2019-05-20 RX ORDER — ACETAMINOPHEN 500 MG
1000 TABLET ORAL ONCE
Status: COMPLETED | OUTPATIENT
Start: 2019-05-20 | End: 2019-05-20

## 2019-05-20 RX ORDER — HYDROCODONE BITARTRATE AND ACETAMINOPHEN 5; 325 MG/1; MG/1
1 TABLET ORAL EVERY 4 HOURS PRN
Status: DISCONTINUED | OUTPATIENT
Start: 2019-05-20 | End: 2019-05-21

## 2019-05-20 RX ORDER — HYDROCODONE BITARTRATE AND ACETAMINOPHEN 5; 325 MG/1; MG/1
2 TABLET ORAL EVERY 4 HOURS PRN
Status: DISCONTINUED | OUTPATIENT
Start: 2019-05-20 | End: 2019-05-21

## 2019-05-20 RX ORDER — BUPIVACAINE HYDROCHLORIDE 2.5 MG/ML
INJECTION, SOLUTION EPIDURAL; INFILTRATION; INTRACAUDAL AS NEEDED
Status: DISCONTINUED | OUTPATIENT
Start: 2019-05-20 | End: 2019-05-20 | Stop reason: HOSPADM

## 2019-05-20 RX ORDER — ROCURONIUM BROMIDE 10 MG/ML
INJECTION, SOLUTION INTRAVENOUS AS NEEDED
Status: DISCONTINUED | OUTPATIENT
Start: 2019-05-20 | End: 2019-05-20 | Stop reason: SURG

## 2019-05-20 RX ORDER — SODIUM CHLORIDE, SODIUM LACTATE, POTASSIUM CHLORIDE, CALCIUM CHLORIDE 600; 310; 30; 20 MG/100ML; MG/100ML; MG/100ML; MG/100ML
INJECTION, SOLUTION INTRAVENOUS CONTINUOUS
Status: DISCONTINUED | OUTPATIENT
Start: 2019-05-20 | End: 2019-05-21

## 2019-05-20 RX ORDER — MORPHINE SULFATE 2 MG/ML
2 INJECTION, SOLUTION INTRAMUSCULAR; INTRAVENOUS EVERY 10 MIN PRN
Status: DISCONTINUED | OUTPATIENT
Start: 2019-05-20 | End: 2019-05-20 | Stop reason: HOSPADM

## 2019-05-20 RX ORDER — MIDAZOLAM HYDROCHLORIDE 1 MG/ML
INJECTION INTRAMUSCULAR; INTRAVENOUS AS NEEDED
Status: DISCONTINUED | OUTPATIENT
Start: 2019-05-20 | End: 2019-05-20 | Stop reason: SURG

## 2019-05-20 RX ORDER — ONDANSETRON 2 MG/ML
4 INJECTION INTRAMUSCULAR; INTRAVENOUS EVERY 6 HOURS PRN
Status: DISCONTINUED | OUTPATIENT
Start: 2019-05-20 | End: 2019-05-21

## 2019-05-20 RX ORDER — FAMOTIDINE 20 MG/1
20 TABLET ORAL ONCE
Status: COMPLETED | OUTPATIENT
Start: 2019-05-20 | End: 2019-05-20

## 2019-05-20 RX ORDER — HALOPERIDOL 5 MG/ML
0.25 INJECTION INTRAMUSCULAR ONCE AS NEEDED
Status: DISCONTINUED | OUTPATIENT
Start: 2019-05-20 | End: 2019-05-20 | Stop reason: HOSPADM

## 2019-05-20 RX ORDER — HYDROMORPHONE HYDROCHLORIDE 1 MG/ML
0.6 INJECTION, SOLUTION INTRAMUSCULAR; INTRAVENOUS; SUBCUTANEOUS EVERY 5 MIN PRN
Status: DISCONTINUED | OUTPATIENT
Start: 2019-05-20 | End: 2019-05-20 | Stop reason: HOSPADM

## 2019-05-20 RX ORDER — ONDANSETRON 2 MG/ML
INJECTION INTRAMUSCULAR; INTRAVENOUS AS NEEDED
Status: DISCONTINUED | OUTPATIENT
Start: 2019-05-20 | End: 2019-05-20 | Stop reason: SURG

## 2019-05-20 RX ORDER — HEPARIN SODIUM 5000 [USP'U]/ML
5000 INJECTION, SOLUTION INTRAVENOUS; SUBCUTANEOUS ONCE
Status: COMPLETED | OUTPATIENT
Start: 2019-05-20 | End: 2019-05-20

## 2019-05-20 RX ORDER — NALOXONE HYDROCHLORIDE 0.4 MG/ML
80 INJECTION, SOLUTION INTRAMUSCULAR; INTRAVENOUS; SUBCUTANEOUS AS NEEDED
Status: DISCONTINUED | OUTPATIENT
Start: 2019-05-20 | End: 2019-05-20 | Stop reason: HOSPADM

## 2019-05-20 RX ORDER — GLYCOPYRROLATE 0.2 MG/ML
INJECTION INTRAMUSCULAR; INTRAVENOUS AS NEEDED
Status: DISCONTINUED | OUTPATIENT
Start: 2019-05-20 | End: 2019-05-20 | Stop reason: SURG

## 2019-05-20 RX ORDER — METOCLOPRAMIDE 10 MG/1
10 TABLET ORAL ONCE
Status: COMPLETED | OUTPATIENT
Start: 2019-05-20 | End: 2019-05-20

## 2019-05-20 RX ORDER — ONDANSETRON 2 MG/ML
4 INJECTION INTRAMUSCULAR; INTRAVENOUS ONCE AS NEEDED
Status: DISCONTINUED | OUTPATIENT
Start: 2019-05-20 | End: 2019-05-20 | Stop reason: HOSPADM

## 2019-05-20 RX ORDER — SODIUM CHLORIDE, SODIUM LACTATE, POTASSIUM CHLORIDE, CALCIUM CHLORIDE 600; 310; 30; 20 MG/100ML; MG/100ML; MG/100ML; MG/100ML
INJECTION, SOLUTION INTRAVENOUS CONTINUOUS
Status: DISCONTINUED | OUTPATIENT
Start: 2019-05-20 | End: 2019-05-20 | Stop reason: HOSPADM

## 2019-05-20 RX ORDER — HYDROMORPHONE HYDROCHLORIDE 1 MG/ML
0.2 INJECTION, SOLUTION INTRAMUSCULAR; INTRAVENOUS; SUBCUTANEOUS EVERY 5 MIN PRN
Status: DISCONTINUED | OUTPATIENT
Start: 2019-05-20 | End: 2019-05-20 | Stop reason: HOSPADM

## 2019-05-20 RX ORDER — HYDROMORPHONE HYDROCHLORIDE 1 MG/ML
0.4 INJECTION, SOLUTION INTRAMUSCULAR; INTRAVENOUS; SUBCUTANEOUS EVERY 5 MIN PRN
Status: DISCONTINUED | OUTPATIENT
Start: 2019-05-20 | End: 2019-05-20 | Stop reason: HOSPADM

## 2019-05-20 RX ORDER — PROCHLORPERAZINE EDISYLATE 5 MG/ML
5 INJECTION INTRAMUSCULAR; INTRAVENOUS ONCE AS NEEDED
Status: DISCONTINUED | OUTPATIENT
Start: 2019-05-20 | End: 2019-05-20 | Stop reason: HOSPADM

## 2019-05-20 RX ORDER — MORPHINE SULFATE 4 MG/ML
4 INJECTION, SOLUTION INTRAMUSCULAR; INTRAVENOUS EVERY 2 HOUR PRN
Status: DISCONTINUED | OUTPATIENT
Start: 2019-05-20 | End: 2019-05-21

## 2019-05-20 RX ORDER — MORPHINE SULFATE 4 MG/ML
8 INJECTION, SOLUTION INTRAMUSCULAR; INTRAVENOUS EVERY 2 HOUR PRN
Status: DISCONTINUED | OUTPATIENT
Start: 2019-05-20 | End: 2019-05-21

## 2019-05-20 RX ORDER — CEFAZOLIN SODIUM/WATER 2 G/20 ML
2 SYRINGE (ML) INTRAVENOUS ONCE
Status: COMPLETED | OUTPATIENT
Start: 2019-05-20 | End: 2019-05-20

## 2019-05-20 RX ORDER — HYDROCODONE BITARTRATE AND ACETAMINOPHEN 5; 325 MG/1; MG/1
1 TABLET ORAL AS NEEDED
Status: DISCONTINUED | OUTPATIENT
Start: 2019-05-20 | End: 2019-05-20 | Stop reason: HOSPADM

## 2019-05-20 RX ORDER — DEXAMETHASONE SODIUM PHOSPHATE 4 MG/ML
VIAL (ML) INJECTION AS NEEDED
Status: DISCONTINUED | OUTPATIENT
Start: 2019-05-20 | End: 2019-05-20 | Stop reason: SURG

## 2019-05-20 RX ORDER — HYDROCODONE BITARTRATE AND ACETAMINOPHEN 5; 325 MG/1; MG/1
2 TABLET ORAL AS NEEDED
Status: DISCONTINUED | OUTPATIENT
Start: 2019-05-20 | End: 2019-05-20 | Stop reason: HOSPADM

## 2019-05-20 RX ORDER — LIDOCAINE HYDROCHLORIDE 10 MG/ML
INJECTION, SOLUTION EPIDURAL; INFILTRATION; INTRACAUDAL; PERINEURAL AS NEEDED
Status: DISCONTINUED | OUTPATIENT
Start: 2019-05-20 | End: 2019-05-20 | Stop reason: SURG

## 2019-05-20 RX ORDER — MORPHINE SULFATE 10 MG/ML
6 INJECTION, SOLUTION INTRAMUSCULAR; INTRAVENOUS EVERY 10 MIN PRN
Status: DISCONTINUED | OUTPATIENT
Start: 2019-05-20 | End: 2019-05-20 | Stop reason: HOSPADM

## 2019-05-20 RX ORDER — MORPHINE SULFATE 2 MG/ML
2 INJECTION, SOLUTION INTRAMUSCULAR; INTRAVENOUS EVERY 2 HOUR PRN
Status: DISCONTINUED | OUTPATIENT
Start: 2019-05-20 | End: 2019-05-21

## 2019-05-20 RX ORDER — NEOSTIGMINE METHYLSULFATE 0.5 MG/ML
INJECTION INTRAVENOUS AS NEEDED
Status: DISCONTINUED | OUTPATIENT
Start: 2019-05-20 | End: 2019-05-20 | Stop reason: SURG

## 2019-05-20 RX ORDER — MORPHINE SULFATE 4 MG/ML
4 INJECTION, SOLUTION INTRAMUSCULAR; INTRAVENOUS EVERY 10 MIN PRN
Status: DISCONTINUED | OUTPATIENT
Start: 2019-05-20 | End: 2019-05-20 | Stop reason: HOSPADM

## 2019-05-20 RX ADMIN — CEFAZOLIN SODIUM/WATER 2 G: 2 G/20 ML SYRINGE (ML) INTRAVENOUS at 14:07:00

## 2019-05-20 RX ADMIN — ONDANSETRON 4 MG: 2 INJECTION INTRAMUSCULAR; INTRAVENOUS at 13:56:00

## 2019-05-20 RX ADMIN — MIDAZOLAM HYDROCHLORIDE 2 MG: 1 INJECTION INTRAMUSCULAR; INTRAVENOUS at 13:55:00

## 2019-05-20 RX ADMIN — NEOSTIGMINE METHYLSULFATE 5 MG: 0.5 INJECTION INTRAVENOUS at 15:08:00

## 2019-05-20 RX ADMIN — DEXAMETHASONE SODIUM PHOSPHATE 4 MG: 4 MG/ML VIAL (ML) INJECTION at 13:56:00

## 2019-05-20 RX ADMIN — GLYCOPYRROLATE 0.8 MG: 0.2 INJECTION INTRAMUSCULAR; INTRAVENOUS at 15:08:00

## 2019-05-20 RX ADMIN — GLYCOPYRROLATE 0.2 MG: 0.2 INJECTION INTRAMUSCULAR; INTRAVENOUS at 13:56:00

## 2019-05-20 RX ADMIN — SODIUM CHLORIDE, SODIUM LACTATE, POTASSIUM CHLORIDE, CALCIUM CHLORIDE: 600; 310; 30; 20 INJECTION, SOLUTION INTRAVENOUS at 15:11:00

## 2019-05-20 RX ADMIN — ROCURONIUM BROMIDE 10 MG: 10 INJECTION, SOLUTION INTRAVENOUS at 14:00:00

## 2019-05-20 RX ADMIN — SODIUM CHLORIDE, SODIUM LACTATE, POTASSIUM CHLORIDE, CALCIUM CHLORIDE: 600; 310; 30; 20 INJECTION, SOLUTION INTRAVENOUS at 13:55:00

## 2019-05-20 RX ADMIN — LIDOCAINE HYDROCHLORIDE 50 MG: 10 INJECTION, SOLUTION EPIDURAL; INFILTRATION; INTRACAUDAL; PERINEURAL at 14:00:00

## 2019-05-20 RX ADMIN — ROCURONIUM BROMIDE 30 MG: 10 INJECTION, SOLUTION INTRAVENOUS at 14:12:00

## 2019-05-20 NOTE — OPERATIVE REPORT
Elizabeth Ards / Virgilio Noel / Winston Edwards / Scotty Fernandez / Pedro Hilliard / Porfirio [facility] - [phone]  Answering Service [phone]        Date: 5/20/2019  Preop Diagnosis: Morbid Obesity secondary to excess calories   Postop D and 2.5-3 cm defect by the umbilicus. I started with the right upper quadrant hernia. The fascia was closed primarily using 0 PDS in figure of 8 fashion using the Kiko Boom through a stab incision in the middle of the defect.   I then prepared an 8

## 2019-05-20 NOTE — ANESTHESIA POSTPROCEDURE EVALUATION
Patient: Tiffany Patel    Procedure Summary     Date:  05/20/19 Room / Location:  [address] [place] [address] MAIN OR  / [address] [place] [address] MAIN OR    Anesthesia Start:  1355 Anesthesia Stop:      Procedures:       LAPAROSCOPIC VENTRAL HERNIA REPAIR (N/A )      LAPAROSCOPIC UMBILICAL HERNIA

## 2019-05-20 NOTE — ANESTHESIA PREPROCEDURE EVALUATION
Anesthesia PreOp Note    HPI:     Lam Francisco is a 40year old female who presents for preoperative consultation requested by:  Shaquille Mejía MD    Date of Surgery: 5/20/2019    Procedure(s):  LAPAROSCOPIC VENTRAL HERNIA REPAIR  LAPAROSCOPIC UMBILICAL HE thumb surgery         Medications Prior to Admission:  Biotin 2500 MCG Oral Cap Take 1 capsule by mouth daily. Disp:  Rfl:  5/17/2019   ergocalciferol (DRISDOL) 66730 units Oral Cap Take 1 capsule (50,000 Units total) by mouth once a week for 24 doses.  Dis No      Sexual activity: Not on file    Lifestyle      Physical activity:        Days per week: Not on file        Minutes per session: Not on file      Stress: Not on file    Relationships      Social connections:        Talks on phone: Not on file Ventral hernia now  Abdominal  - normal exam             Anesthesia Plan:   ASA:  3  Plan:   General  Airway:  ETT  Post-op Pain Management: IV analgesics and Oral pain medication  Informed Consent Plan and Risks Discussed With:  Patient and spouse  Use of

## 2019-05-20 NOTE — ANESTHESIA PROCEDURE NOTES
Airway  Urgency: elective    Airway not difficult    General Information and Staff    Patient location during procedure: OR  Anesthesiologist: Ayesha Harrison MD  Resident/CRNA: Ashli He CRNA  Performed: anesthesiologist and CRNA     Ind

## 2019-05-20 NOTE — PROGRESS NOTES
[facility]    Progress Note    Chito Ibrahim Patient Status:  Outpatient in a Bed    1975 MRN [id number]   Location [address] [facility] Attending Marilyn Stoner MD;[facility]*   Hosp Day # 0 PCP Coretta Thomas, Morbid obesity with BMI of 45.0-49.9, adult ([address]  MONITOR HEMODYNAMIC AND RESP STATUS, HAD LAP WENDI EN Y GASTRIC BYPASS 12/2018.              Results:     Lab Results   Component Value Date    WBC 6.3 03/21/2019    HGB 14.1 03/21/2019    HCT 42.7 03/21/20

## 2019-05-20 NOTE — H&P
Jim Sosa / Elvira Doss / Aleisha Sloan / Rosemarie Aguilar / Nallely Galloway / Shilpa [facility] - [phone]  Answering Service [phone]        Cedric Anderson Patient Status:  Outpatient in a Bed    1975 MRN [id number] Prior to Admission:  Biotin 2500 MCG Oral Cap Take 1 capsule by mouth daily. Disp:  Rfl:  5/17/2019   ergocalciferol (DRISDOL) 76934 units Oral Cap Take 1 capsule (50,000 Units total) by mouth once a week for 24 doses.  Disp: 30 capsule Rfl: 0 5/17/2019   M 6.3 03/21/2019    RBC 4.78 03/21/2019     BMP:   Lab Results   Component Value Date    CO2 29.0 03/21/2019    BUN 9 03/21/2019     Coagulation:   Lab Results   Component Value Date    INR 1.1 05/31/2018       Assessment/Plan:  Incisional hernia after lap c

## 2019-05-20 NOTE — PROGRESS NOTES
Hayley'd patient from PACU, recvd telephone report from [facility], RN-Patient A&Ox4, c/o 8/10 abdominal pain lower abd-pointed to new incision sites.  Find 9 new lap sites closed with dermabond- administered 2mgIV of Morphine, patient up in chair, tolerating well

## 2019-05-21 VITALS
BODY MASS INDEX: 46.74 KG/M2 | TEMPERATURE: 98 F | HEART RATE: 56 BPM | DIASTOLIC BLOOD PRESSURE: 62 MMHG | HEIGHT: 62 IN | WEIGHT: 254 LBS | OXYGEN SATURATION: 96 % | RESPIRATION RATE: 18 BRPM | SYSTOLIC BLOOD PRESSURE: 145 MMHG

## 2019-05-21 PROCEDURE — 99217 OBSERVATION CARE DISCHARGE: CPT | Performed by: HOSPITALIST

## 2019-05-21 RX ORDER — HYDROCODONE BITARTRATE AND ACETAMINOPHEN 7.5; 325 MG/1; MG/1
1 TABLET ORAL EVERY 6 HOURS PRN
Qty: 20 TABLET | Refills: 0 | Status: SHIPPED | OUTPATIENT
Start: 2019-05-21 | End: 2019-07-22

## 2019-05-21 RX ORDER — 0.9 % SODIUM CHLORIDE 0.9 %
VIAL (ML) INJECTION
Status: COMPLETED
Start: 2019-05-21 | End: 2019-05-21

## 2019-05-21 RX ORDER — HYDROCODONE BITARTRATE AND ACETAMINOPHEN 7.5; 325 MG/1; MG/1
1 TABLET ORAL EVERY 6 HOURS PRN
Status: DISCONTINUED | OUTPATIENT
Start: 2019-05-21 | End: 2019-05-21

## 2019-05-21 RX ORDER — HYDROCODONE BITARTRATE AND ACETAMINOPHEN 7.5; 325 MG/1; MG/1
2 TABLET ORAL EVERY 6 HOURS PRN
Status: DISCONTINUED | OUTPATIENT
Start: 2019-05-21 | End: 2019-05-21

## 2019-05-21 NOTE — PLAN OF CARE
Problem: Patient Centered Care  Goal: Patient preferences are identified and integrated in the patient's plan of care  Description  Interventions:  - What would you like us to know as we care for you?  I got gastric bypass surgery recently  - Provide time of fever/infection during anticipated neutropenic period  Description  INTERVENTIONS  - Monitor WBC  - Administer growth factors as ordered  - Implement neutropenic guidelines  Outcome: Adequate for Discharge     Problem: SAFETY ADULT - FALL  Goal: Free fr Nasogastric tube to low intermittent suction as ordered  - Evaluate effectiveness of ordered antiemetic medications  - Provide nonpharmacologic comfort measures as appropriate  - Advance diet as tolerated, if ordered  - Obtain nutritional consult as needed

## 2019-05-21 NOTE — DISCHARGE SUMMARY
[facility]    Discharge Summary    Lane Pittman Patient Status:  Outpatient in a Bed    1975 MRN [id number]   Location [facility] 4W/SW/SE Attending Domi Granado MD   Hosp Day # 0 PCP Flor Hartman MD     Date of Adm Condition:  Good    Discharge Medications:      Discharge Medications      START taking these medications      Instructions Prescription details   HYDROcodone-acetaminophen 7.5-325 MG Tabs  Commonly known as:  NORCO      Take 1 tablet by mouth every 6 (six follow-up. >35 minutes spent preparing this discharge.     Bryan Griffiths  5/21/2019  1:30 PM

## 2019-05-21 NOTE — PROGRESS NOTES
Jaiden Santos / Elsy Grigsby / Jacklyn Green / Donovan Cabello / Sorin Valdez / Mateo  Office - [phone]  Answering Service [phone]      Progress Note    Dev Jaffe Patient Status:  Outpatient in a Bed    1975 MR

## 2019-05-21 NOTE — PROGRESS NOTES
[facility]    Progress Note    Avi Katz Patient Status:  Outpatient in a Bed    1975 MRN [id number]   Location [facility] 4W/SW/SE Attending Maranda Riggins MD   Hosp Day # 0 PCP Tamar Jason MD       Subjective: Full    Jaquelin Baker MD  5/21/2019

## 2019-06-05 ENCOUNTER — OFFICE VISIT (OUTPATIENT)
Dept: FAMILY MEDICINE CLINIC | Facility: CLINIC | Age: 44
End: 2019-06-05
Payer: COMMERCIAL

## 2019-06-05 ENCOUNTER — TELEPHONE (OUTPATIENT)
Dept: SURGERY | Facility: CLINIC | Age: 44
End: 2019-06-05

## 2019-06-05 ENCOUNTER — OFFICE VISIT (OUTPATIENT)
Dept: SURGERY | Facility: CLINIC | Age: 44
End: 2019-06-05
Payer: COMMERCIAL

## 2019-06-05 VITALS
DIASTOLIC BLOOD PRESSURE: 83 MMHG | TEMPERATURE: 98 F | HEART RATE: 68 BPM | WEIGHT: 247 LBS | SYSTOLIC BLOOD PRESSURE: 126 MMHG | BODY MASS INDEX: 47 KG/M2

## 2019-06-05 VITALS
DIASTOLIC BLOOD PRESSURE: 78 MMHG | OXYGEN SATURATION: 99 % | BODY MASS INDEX: 46.76 KG/M2 | RESPIRATION RATE: 16 BRPM | WEIGHT: 247.69 LBS | HEIGHT: 61 IN | SYSTOLIC BLOOD PRESSURE: 127 MMHG | HEART RATE: 70 BPM

## 2019-06-05 DIAGNOSIS — Z09 HOSPITAL DISCHARGE FOLLOW-UP: Primary | ICD-10-CM

## 2019-06-05 PROCEDURE — 99495 TRANSJ CARE MGMT MOD F2F 14D: CPT | Performed by: FAMILY MEDICINE

## 2019-06-05 NOTE — PROGRESS NOTES
[address] [place] [address]  [address]  [address] [address]  Dept: [phone]    6/5/2019   Bariatric Patient Post-op Evaluation    Chief Complaint:  Morbid obesity, RYGB 12/17/18, preop 317lbs education level: Not on file    Tobacco Use      Smoking status: Former Smoker        Packs/day: 0.50        Years: 10.00        Pack years: 5        Types: Cigarettes        Quit date: 2011        Years since quittin.3      Smokeless tobacco: Nev well, no SSIExtremities: normal strength, normal ROM, walks without assist device  Neuro: no focal deficits, cranial nerves grossly intact  Psych: alert and oriented, normal affect  Skin: warm, dry    Assessment: 37year old female who is s/p lap RYGB, now

## 2019-06-13 NOTE — PROGRESS NOTES
HPI:    Yajaira Porter is a 40year old female here today for hospital follow up.    She was discharged from Observation status in a [facility], [address] Date: 5/20  Discharge Date: 5/21  Hospital Discharge Diagnosis:    Ventral and umbilical No current facility-administered medications on file prior to visit.        HISTORY: reconciled and reviewed with patient  She  has a past medical history of Back problem, Fatty liver, Morbid obesity with BMI of 60.0-69.9, adult ([facility] [address] S/P gastric bypas Pulse 68   Temp 97.8 °F (36.6 °C) (Oral)   Wt 247 lb (112 kg)   LMP 05/19/2019 (Exact Date)   BMI 46.67 kg/m²    GENERAL: well developed, well nourished, in no apparent distress  SKIN: no rashes, no suspicious lesions  HEENT: atraumatic, normocephalic, ear

## 2019-06-17 ENCOUNTER — OFFICE VISIT (OUTPATIENT)
Dept: FAMILY MEDICINE CLINIC | Facility: CLINIC | Age: 44
End: 2019-06-17
Payer: COMMERCIAL

## 2019-06-17 ENCOUNTER — NURSE TRIAGE (OUTPATIENT)
Dept: OTHER | Age: 44
End: 2019-06-17

## 2019-06-17 VITALS
DIASTOLIC BLOOD PRESSURE: 75 MMHG | WEIGHT: 249 LBS | SYSTOLIC BLOOD PRESSURE: 122 MMHG | HEART RATE: 65 BPM | BODY MASS INDEX: 47.01 KG/M2 | TEMPERATURE: 98 F | HEIGHT: 61 IN

## 2019-06-17 DIAGNOSIS — K64.9 HEMORRHOIDS, UNSPECIFIED HEMORRHOID TYPE: Primary | ICD-10-CM

## 2019-06-17 PROCEDURE — 99213 OFFICE O/P EST LOW 20 MIN: CPT | Performed by: PHYSICIAN ASSISTANT

## 2019-06-17 PROCEDURE — 99212 OFFICE O/P EST SF 10 MIN: CPT | Performed by: PHYSICIAN ASSISTANT

## 2019-06-17 NOTE — PATIENT INSTRUCTIONS
Drink as much water as you can tolerate. Increase your fiber intake. Increase fruit and vegetable intake    You can use tux pads and preparation H from the pharmacy  You can sit in the bathtub filled with warm water for 10-15 min about 2-3 times per day. available at most drugstores. Over-the-counter hemorrhoid ointments and petroleum jelly can also provide relief. Add fiber to your diet  Adding fiber to your diet can help relieve constipation by making stools softer and easier to pass.  To increase your f bananas, raisins, peaches, and pears. · Nuts and legumes, especially peanuts, lentils, and kidney beans. Easy ways to add fiber  The tips below offer some simple ways to add more high-fiber foods to your meals.   · Start your day with a high-fiber breakfa hemorrhoid can help relieve pain right away. It will also help reduce the blood clot. Use the ice for 15 to 20 minutes at a time. Keep a cloth between the ice and your skin to prevent skin damage.   · Use other measures. Laxatives and enemas can help ease c making your stools softer, they help heal and prevent swollen hemorrhoids. They may also help reduce the risk of colon and rectal cancer. Best of all, they’re usually low in calories and taste great. Here are some examples of fiber-rich foods.   · Whole gra

## 2019-06-17 NOTE — PROGRESS NOTES
HPI:    Patient ID: Jim Hernandez is a 40year old female. Patient presents for blood in stool for the past 2 days. On Friday she noted she had a bowel movement and afterwards she had noticed a ball of bright red blood that was inside the stool.  Will Normal rate, regular rhythm and normal heart sounds. Pulmonary/Chest: Effort normal and breath sounds normal. She has no wheezes. She has no rales. Abdominal: Soft. Bowel sounds are normal. She exhibits no distension. There is no tenderness.  There is

## 2019-06-17 NOTE — TELEPHONE ENCOUNTER
Action Requested: Summary for Provider     []  Critical Lab, Recommendations Needed  [x] Need Additional Advice  []   FYI    []   Need Orders  [] Need Medications Sent to Pharmacy  []  Other     SUMMARY: Patient refusing ED and requesting OV today.  Patient

## 2019-06-17 NOTE — TELEPHONE ENCOUNTER
Patient calling to schedule visit. OV scheduled for today 6/17/19 with Marvin Gonzales at 1:15pm at [place] [address].

## 2019-06-19 ENCOUNTER — OFFICE VISIT (OUTPATIENT)
Dept: SURGERY | Facility: CLINIC | Age: 44
End: 2019-06-19
Payer: COMMERCIAL

## 2019-06-19 VITALS
DIASTOLIC BLOOD PRESSURE: 80 MMHG | RESPIRATION RATE: 16 BRPM | BODY MASS INDEX: 46.07 KG/M2 | WEIGHT: 244 LBS | HEIGHT: 61 IN | SYSTOLIC BLOOD PRESSURE: 120 MMHG

## 2019-06-19 DIAGNOSIS — K43.2 VENTRAL INCISIONAL HERNIA: ICD-10-CM

## 2019-06-19 DIAGNOSIS — Z98.84 S/P GASTRIC BYPASS: ICD-10-CM

## 2019-06-19 DIAGNOSIS — I10 ESSENTIAL HYPERTENSION: Primary | ICD-10-CM

## 2019-06-19 PROBLEM — K62.5 RECTAL BLEED: Status: ACTIVE | Noted: 2019-06-19

## 2019-06-19 NOTE — PROGRESS NOTES
[address] [address]  Dept: [phone]    6/19/2019   Bariatric Patient Post-op Evaluation    Chief Complaint:  Morbid obesity, RYGB 12/17/18, preop 317lb Mother    • Thyroid Disorder Mother    • Cancer Paternal Grandmother    • Cancer Paternal Grandfather        Social History:  Social History    Socioeconomic History      Marital status:       Spouse name: Not on file      Number of children: Not on hernias anymore, various new lap incisions all healing well, no SSI  Extremities: normal strength, normal ROM, walks without assist device  Neuro: no focal deficits, cranial nerves grossly intact  Psych: alert and oriented, normal affect  Skin: warm, dry

## 2019-07-05 ENCOUNTER — LAB ENCOUNTER (OUTPATIENT)
Dept: LAB | Facility: HOSPITAL | Age: 44
End: 2019-07-05
Attending: NURSE PRACTITIONER
Payer: COMMERCIAL

## 2019-07-05 DIAGNOSIS — I10 ESSENTIAL HYPERTENSION: ICD-10-CM

## 2019-07-05 DIAGNOSIS — Z98.84 S/P GASTRIC BYPASS: ICD-10-CM

## 2019-07-05 DIAGNOSIS — L83 ACANTHOSIS NIGRICANS: ICD-10-CM

## 2019-07-05 DIAGNOSIS — R60.0 LOWER EXTREMITY EDEMA: ICD-10-CM

## 2019-07-05 DIAGNOSIS — E66.01 MORBID OBESITY WITH BMI OF 50.0-59.9, ADULT (HCC): ICD-10-CM

## 2019-07-05 LAB
ALBUMIN SERPL-MCNC: 3.1 G/DL (ref 3.4–5)
ALBUMIN/GLOB SERPL: 0.8 {RATIO} (ref 1–2)
ALP LIVER SERPL-CCNC: 81 U/L (ref 37–98)
ALT SERPL-CCNC: 37 U/L (ref 13–56)
ANION GAP SERPL CALC-SCNC: 3 MMOL/L (ref 0–18)
AST SERPL-CCNC: 41 U/L (ref 15–37)
BASOPHILS # BLD AUTO: 0.05 X10(3) UL (ref 0–0.2)
BASOPHILS NFR BLD AUTO: 0.7 %
BILIRUB SERPL-MCNC: 0.7 MG/DL (ref 0.1–2)
BUN BLD-MCNC: 10 MG/DL (ref 7–18)
BUN/CREAT SERPL: 17.2 (ref 10–20)
CALCIUM BLD-MCNC: 8.8 MG/DL (ref 8.5–10.1)
CHLORIDE SERPL-SCNC: 112 MMOL/L (ref 98–112)
CO2 SERPL-SCNC: 28 MMOL/L (ref 21–32)
CREAT BLD-MCNC: 0.58 MG/DL (ref 0.55–1.02)
DEPRECATED HBV CORE AB SER IA-ACNC: 44.7 NG/ML (ref 12–240)
DEPRECATED RDW RBC AUTO: 40.7 FL (ref 35.1–46.3)
EOSINOPHIL # BLD AUTO: 0.28 X10(3) UL (ref 0–0.7)
EOSINOPHIL NFR BLD AUTO: 3.9 %
ERYTHROCYTE [DISTWIDTH] IN BLOOD BY AUTOMATED COUNT: 12.5 % (ref 11–15)
EST. AVERAGE GLUCOSE BLD GHB EST-MCNC: 91 MG/DL (ref 68–126)
FOLATE SERPL-MCNC: 19.1 NG/ML (ref 8.7–?)
GLOBULIN PLAS-MCNC: 3.7 G/DL (ref 2.8–4.4)
GLUCOSE BLD-MCNC: 83 MG/DL (ref 70–99)
HAV IGM SER QL: 2.1 MG/DL (ref 1.6–2.6)
HBA1C MFR BLD HPLC: 4.8 % (ref ?–5.7)
HCT VFR BLD AUTO: 41.1 % (ref 35–48)
HGB BLD-MCNC: 13.8 G/DL (ref 12–16)
IMM GRANULOCYTES # BLD AUTO: 0.01 X10(3) UL (ref 0–1)
IMM GRANULOCYTES NFR BLD: 0.1 %
IRON SATURATION: 26 % (ref 15–50)
IRON SERPL-MCNC: 97 UG/DL (ref 50–170)
LYMPHOCYTES # BLD AUTO: 2.83 X10(3) UL (ref 1–4)
LYMPHOCYTES NFR BLD AUTO: 39.8 %
M PROTEIN MFR SERPL ELPH: 6.8 G/DL (ref 6.4–8.2)
MCH RBC QN AUTO: 30.2 PG (ref 26–34)
MCHC RBC AUTO-ENTMCNC: 33.6 G/DL (ref 31–37)
MCV RBC AUTO: 89.9 FL (ref 80–100)
MONOCYTES # BLD AUTO: 0.38 X10(3) UL (ref 0.1–1)
MONOCYTES NFR BLD AUTO: 5.3 %
NEUTROPHILS # BLD AUTO: 3.56 X10 (3) UL (ref 1.5–7.7)
NEUTROPHILS # BLD AUTO: 3.56 X10(3) UL (ref 1.5–7.7)
NEUTROPHILS NFR BLD AUTO: 50.2 %
OSMOLALITY SERPL CALC.SUM OF ELEC: 294 MOSM/KG (ref 275–295)
PATIENT FASTING: YES
PHOSPHATE SERPL-MCNC: 2.8 MG/DL (ref 2.5–4.9)
PLATELET # BLD AUTO: 182 10(3)UL (ref 150–450)
POTASSIUM SERPL-SCNC: 3.6 MMOL/L (ref 3.5–5.1)
RBC # BLD AUTO: 4.57 X10(6)UL (ref 3.8–5.3)
SODIUM SERPL-SCNC: 143 MMOL/L (ref 136–145)
TOTAL IRON BINDING CAPACITY: 367 UG/DL (ref 240–450)
TRANSFERRIN SERPL-MCNC: 246 MG/DL (ref 200–360)
VIT B12 SERPL-MCNC: 371 PG/ML (ref 193–986)
WBC # BLD AUTO: 7.1 X10(3) UL (ref 4–11)

## 2019-07-05 PROCEDURE — 83735 ASSAY OF MAGNESIUM: CPT

## 2019-07-05 PROCEDURE — 82746 ASSAY OF FOLIC ACID SERUM: CPT

## 2019-07-05 PROCEDURE — 82728 ASSAY OF FERRITIN: CPT

## 2019-07-05 PROCEDURE — 80053 COMPREHEN METABOLIC PANEL: CPT

## 2019-07-05 PROCEDURE — 84466 ASSAY OF TRANSFERRIN: CPT

## 2019-07-05 PROCEDURE — 84100 ASSAY OF PHOSPHORUS: CPT

## 2019-07-05 PROCEDURE — 84425 ASSAY OF VITAMIN B-1: CPT

## 2019-07-05 PROCEDURE — 82607 VITAMIN B-12: CPT

## 2019-07-05 PROCEDURE — 36415 COLL VENOUS BLD VENIPUNCTURE: CPT

## 2019-07-05 PROCEDURE — 83540 ASSAY OF IRON: CPT

## 2019-07-05 PROCEDURE — 83036 HEMOGLOBIN GLYCOSYLATED A1C: CPT

## 2019-07-05 PROCEDURE — 85025 COMPLETE CBC W/AUTO DIFF WBC: CPT

## 2019-07-08 LAB — VITAMIN B1 (THIAMINE), WHOLE B: 164 NMOL/L

## 2019-07-10 ENCOUNTER — TELEPHONE (OUTPATIENT)
Dept: SURGERY | Facility: CLINIC | Age: 44
End: 2019-07-10

## 2019-07-10 NOTE — TELEPHONE ENCOUNTER
VM left to discuss recent results. Await CB. Thiamine, a1c, ferritin, iron, tibc, magnesium, phosphorus, folate, and cbc in range. B12 mildly low, start 500 mcg otc supplement. CMP essentially normal, AST improving.

## 2019-07-11 ENCOUNTER — OFFICE VISIT (OUTPATIENT)
Dept: SURGERY | Facility: CLINIC | Age: 44
End: 2019-07-11
Payer: COMMERCIAL

## 2019-07-11 VITALS
WEIGHT: 245 LBS | DIASTOLIC BLOOD PRESSURE: 70 MMHG | HEIGHT: 61 IN | SYSTOLIC BLOOD PRESSURE: 118 MMHG | BODY MASS INDEX: 46.26 KG/M2

## 2019-07-11 DIAGNOSIS — L30.4 INTERTRIGO: ICD-10-CM

## 2019-07-11 DIAGNOSIS — Z51.81 ENCOUNTER FOR THERAPEUTIC DRUG MONITORING: ICD-10-CM

## 2019-07-11 DIAGNOSIS — I10 ESSENTIAL HYPERTENSION: Primary | ICD-10-CM

## 2019-07-11 DIAGNOSIS — Z98.84 S/P GASTRIC BYPASS: ICD-10-CM

## 2019-07-11 DIAGNOSIS — E66.01 MORBID OBESITY WITH BMI OF 45.0-49.9, ADULT (HCC): ICD-10-CM

## 2019-07-11 PROCEDURE — 99214 OFFICE O/P EST MOD 30 MIN: CPT | Performed by: INTERNAL MEDICINE

## 2019-07-11 RX ORDER — ERGOCALCIFEROL 1.25 MG/1
CAPSULE ORAL
Refills: 0 | COMMUNITY
Start: 2019-06-17 | End: 2019-07-22

## 2019-07-11 NOTE — PROGRESS NOTES
[address] [place] [address],[address]  Dept: [phone]        Patient:  Chon Connors  :      1975  MRN:      [id number]    Referring Provider: Stormy Palma MD Drug use: No    Surgical History:    Past Surgical History:   Procedure Laterality Date   • BARIATRIC GASTRIC BYPASS LAPAROSCOPIC WENDI  N-Y N/A 2018    Performed by Eloina Guo MD at [address] [place] [address] OR   •      • CHOLECYSTECTOMY     • GASTRIC BYP Essential hypertension  (primary encounter diagnosis)  Intertrigo  S/p gastric bypass  Encounter for therapeutic drug monitoring  Morbid obesity with bmi of 45.0-49.9, adult (hcc)    Plan     S/P yoana en Y 12/17/2018    Feels well    Continue vitamins

## 2019-07-22 ENCOUNTER — OFFICE VISIT (OUTPATIENT)
Dept: FAMILY MEDICINE CLINIC | Facility: CLINIC | Age: 44
End: 2019-07-22
Payer: COMMERCIAL

## 2019-07-22 VITALS
BODY MASS INDEX: 44.75 KG/M2 | TEMPERATURE: 98 F | DIASTOLIC BLOOD PRESSURE: 86 MMHG | HEART RATE: 67 BPM | HEIGHT: 61 IN | WEIGHT: 237 LBS | SYSTOLIC BLOOD PRESSURE: 126 MMHG

## 2019-07-22 DIAGNOSIS — K43.2 VENTRAL INCISIONAL HERNIA: ICD-10-CM

## 2019-07-22 DIAGNOSIS — K59.00 CONSTIPATION, UNSPECIFIED CONSTIPATION TYPE: Primary | ICD-10-CM

## 2019-07-22 PROCEDURE — 99213 OFFICE O/P EST LOW 20 MIN: CPT | Performed by: PHYSICIAN ASSISTANT

## 2019-07-22 NOTE — PROGRESS NOTES
HPI:    Patient ID: Lul Cao is a 40year old female. Patient presents for clearance for work after surgery. She was given a clearance from Dr. Evangelist Delatorre but her work said that she needs clearance from her primary care doctor.  She needs clearance fr advised the following:  -Exam unremarkable  -Patient is cleared to go back to work. -Gave letter to go back on July 30th.   -To call or follow-up with any questions or concerns.   -Pt was agreeable to plan and will comply with discussion above.        2.

## 2019-07-22 NOTE — TELEPHONE ENCOUNTER
Confirmed with patient she discussed results with Dr. Vinita Bautista, follow plan of care discussed at her recent [address] All questions answered.

## 2019-07-31 ENCOUNTER — OFFICE VISIT (OUTPATIENT)
Dept: FAMILY MEDICINE CLINIC | Facility: CLINIC | Age: 44
End: 2019-07-31
Payer: COMMERCIAL

## 2019-07-31 VITALS
HEIGHT: 61 IN | BODY MASS INDEX: 45.5 KG/M2 | SYSTOLIC BLOOD PRESSURE: 143 MMHG | HEART RATE: 59 BPM | DIASTOLIC BLOOD PRESSURE: 72 MMHG | WEIGHT: 241 LBS | TEMPERATURE: 98 F

## 2019-07-31 DIAGNOSIS — L65.9 ALOPECIA: Primary | ICD-10-CM

## 2019-07-31 PROCEDURE — 99212 OFFICE O/P EST SF 10 MIN: CPT | Performed by: PHYSICIAN ASSISTANT

## 2019-07-31 NOTE — PROGRESS NOTES
HPI:    Patient ID: Ronald Upton is a 40year old female. Patient presents for follow-up on work note given at last visit. Patient has bald spots due to vitamin deficiency.  After gastric bypass surgery her body has gone into shock and she has hair lo requested or ordered in this encounter       Imaging & Referrals:  None         ID#[id number]

## 2019-09-19 ENCOUNTER — OFFICE VISIT (OUTPATIENT)
Dept: FAMILY MEDICINE CLINIC | Facility: CLINIC | Age: 44
End: 2019-09-19
Payer: COMMERCIAL

## 2019-09-19 VITALS
HEIGHT: 61 IN | BODY MASS INDEX: 43.88 KG/M2 | DIASTOLIC BLOOD PRESSURE: 79 MMHG | SYSTOLIC BLOOD PRESSURE: 137 MMHG | TEMPERATURE: 97 F | WEIGHT: 232.38 LBS | HEART RATE: 60 BPM

## 2019-09-19 DIAGNOSIS — B34.9 VIRAL SYNDROME: Primary | ICD-10-CM

## 2019-09-19 PROCEDURE — 99213 OFFICE O/P EST LOW 20 MIN: CPT | Performed by: FAMILY MEDICINE

## 2019-09-19 NOTE — PROGRESS NOTES
HPI:    Patient ID: Chito Ibrahim is a 40year old female. Since 4 days coughing No ACE-I order (720h ago, onward)    None    no fever  Ws sweating a lot few days ago. Review of Systems   Constitutional: Positive for fatigue.    HENT: Positive fo

## 2019-09-23 ENCOUNTER — OFFICE VISIT (OUTPATIENT)
Dept: FAMILY MEDICINE CLINIC | Facility: CLINIC | Age: 44
End: 2019-09-23
Payer: COMMERCIAL

## 2019-09-23 VITALS
BODY MASS INDEX: 44.18 KG/M2 | DIASTOLIC BLOOD PRESSURE: 62 MMHG | TEMPERATURE: 98 F | SYSTOLIC BLOOD PRESSURE: 140 MMHG | HEIGHT: 61 IN | WEIGHT: 234 LBS | HEART RATE: 56 BPM

## 2019-09-23 DIAGNOSIS — B34.9 VIRAL SYNDROME: Primary | ICD-10-CM

## 2019-09-23 PROCEDURE — 99213 OFFICE O/P EST LOW 20 MIN: CPT | Performed by: PHYSICIAN ASSISTANT

## 2019-09-23 NOTE — PROGRESS NOTES
HPI:    Patient ID: Lisa Medina is a 40year old female. Patient presents for follow-up for a note for work. She has to be cleared to go back to work. She has improved from her cold and would like to go back to work.        Review of Systems   Radha discharge. Neck: Normal range of motion. Neck supple. Cardiovascular: Normal rate, regular rhythm and normal heart sounds. Pulmonary/Chest: Effort normal and breath sounds normal. She has no wheezes. She has no rales.    Lymphadenopathy:     She has

## 2019-10-03 ENCOUNTER — OFFICE VISIT (OUTPATIENT)
Dept: SURGERY | Facility: CLINIC | Age: 44
End: 2019-10-03
Payer: COMMERCIAL

## 2019-10-03 VITALS
DIASTOLIC BLOOD PRESSURE: 80 MMHG | SYSTOLIC BLOOD PRESSURE: 110 MMHG | BODY MASS INDEX: 43.05 KG/M2 | WEIGHT: 228 LBS | HEIGHT: 61 IN

## 2019-10-03 DIAGNOSIS — E66.01 MORBID OBESITY WITH BMI OF 40.0-44.9, ADULT (HCC): ICD-10-CM

## 2019-10-03 DIAGNOSIS — L30.4 INTERTRIGO: ICD-10-CM

## 2019-10-03 DIAGNOSIS — Z98.84 S/P GASTRIC BYPASS: ICD-10-CM

## 2019-10-03 DIAGNOSIS — E55.9 VITAMIN D DEFICIENCY: Primary | ICD-10-CM

## 2019-10-03 PROCEDURE — 99214 OFFICE O/P EST MOD 30 MIN: CPT | Performed by: INTERNAL MEDICINE

## 2019-10-03 RX ORDER — NYSTATIN 100000 [USP'U]/G
1 POWDER TOPICAL 4 TIMES DAILY
Qty: 30 G | Refills: 1 | Status: SHIPPED | OUTPATIENT
Start: 2019-10-03 | End: 2021-12-13

## 2019-10-03 NOTE — PROGRESS NOTES
[address], [address]  Dept: [phone]        Patient:  Lam Francisco  :      1975  MRN:      [id number]    Referring Provider: Diaz Pappas MD Date   • BARIATRIC GASTRIC BYPASS LAPAROSCOPIC WENDI  N-Y N/A 2018    Performed by eGly Hsieh MD at [address]   •      • CHOLECYSTECTOMY     • GASTRIC BYPASS,OBESE<100CM WENDI-EN-Y  2018    Dr Komal Acevedo, [address] [place] [address] (hcc)  Vitamin d deficiency  (primary encounter diagnosis)    Plan     S/P yoana en Y 12/17/2018    Feels well    Continue vitamins      Keep moving    Follow up with RD and surgeon as scheduled    Umbilical pain noted.   Tried binder, but got rash under pan

## 2019-10-21 ENCOUNTER — NURSE TRIAGE (OUTPATIENT)
Dept: FAMILY MEDICINE CLINIC | Facility: CLINIC | Age: 44
End: 2019-10-21

## 2019-10-21 ENCOUNTER — OFFICE VISIT (OUTPATIENT)
Dept: FAMILY MEDICINE CLINIC | Facility: CLINIC | Age: 44
End: 2019-10-21
Payer: COMMERCIAL

## 2019-10-21 VITALS
TEMPERATURE: 98 F | HEIGHT: 61 IN | HEART RATE: 60 BPM | DIASTOLIC BLOOD PRESSURE: 63 MMHG | BODY MASS INDEX: 43.43 KG/M2 | SYSTOLIC BLOOD PRESSURE: 132 MMHG | WEIGHT: 230 LBS

## 2019-10-21 DIAGNOSIS — R19.7 DIARRHEA, UNSPECIFIED TYPE: Primary | ICD-10-CM

## 2019-10-21 PROCEDURE — 99213 OFFICE O/P EST LOW 20 MIN: CPT | Performed by: PHYSICIAN ASSISTANT

## 2019-10-21 NOTE — PATIENT INSTRUCTIONS
Treating Diarrhea    Diarrhea happens when you have loose, watery, or frequent bowel movements. It is a common problem with many causes. Most cases of diarrhea clear up on their own. But certain cases may need treatment.  Be sure to see your healthcare pr © 9568-4574 [employer] [address]. [facility], [address] [place]. All rights reserved. This information is not intended as a substitute for professional medical care. Always follow your healthcare professional's instructions.       Marion paulson

## 2019-10-21 NOTE — PROGRESS NOTES
HPI:    Patient ID: Elisa Beavers is a 40year old female. Patient presents for abdominal cramping for the past the past 3 days. Reports nausea, vomiting, and diarrhea. Has had diarrhea 4 times per day. Has vomited 5 times since symptoms started.  No b normal and breath sounds normal. She has no wheezes. She has no rales. Abdominal: Soft. Bowel sounds are normal. She exhibits no distension and no mass. There is no tenderness. There is no rebound and no guarding.    Lymphadenopathy:     She has no cervic

## 2019-10-21 NOTE — TELEPHONE ENCOUNTER
Action Requested: Summary for Provider     []  Critical Lab, Recommendations Needed  [] Need Additional Advice  []   FYI    []   Need Orders  [] Need Medications Sent to Pharmacy  []  Other     SUMMARY: Pt requesting OV today; scheduled today with Tejal

## 2019-10-21 NOTE — TELEPHONE ENCOUNTER
Patient calling and state she is throwing up and bad diarrhea since last Friday      Please advise    Transfer to triage

## 2019-11-20 ENCOUNTER — OFFICE VISIT (OUTPATIENT)
Dept: FAMILY MEDICINE CLINIC | Facility: CLINIC | Age: 44
End: 2019-11-20
Payer: COMMERCIAL

## 2019-11-20 ENCOUNTER — TELEPHONE (OUTPATIENT)
Dept: SURGERY | Facility: CLINIC | Age: 44
End: 2019-11-20

## 2019-11-20 VITALS
BODY MASS INDEX: 42.29 KG/M2 | WEIGHT: 224 LBS | SYSTOLIC BLOOD PRESSURE: 134 MMHG | HEART RATE: 64 BPM | TEMPERATURE: 98 F | DIASTOLIC BLOOD PRESSURE: 80 MMHG | HEIGHT: 61 IN

## 2019-11-20 DIAGNOSIS — R05.9 COUGH: Primary | ICD-10-CM

## 2019-11-20 PROCEDURE — 99213 OFFICE O/P EST LOW 20 MIN: CPT | Performed by: PHYSICIAN ASSISTANT

## 2019-11-20 NOTE — PROGRESS NOTES
HPI:    Patient ID: Wendy Leger is a 40year old female. Patient presents for letter for work. She has been coughing for the past 1 week. She was sent from her work because of it. No fever/chills. No congestion noted. No sore throat noted.  No ear sy normal.   Mouth/Throat: Oropharynx is clear and moist. No oropharyngeal exudate or posterior oropharyngeal erythema. Eyes: Conjunctivae are normal. Right eye exhibits no discharge. Left eye exhibits no discharge. Neck: Normal range of motion.  Neck supp

## 2019-11-20 NOTE — TELEPHONE ENCOUNTER
Patient called to make appt with Dr. Valentin Pérez, and MARIO GUILLEN [place] [facility] transferred call to writer because pt c/o \"my right side hurting\". Describes pain as \"pulling and nagging\" and \"not all the time\".    Denies acute pain, shortness of breath or inability to eat/swall

## 2019-11-27 ENCOUNTER — OFFICE VISIT (OUTPATIENT)
Dept: SURGERY | Facility: CLINIC | Age: 44
End: 2019-11-27
Payer: COMMERCIAL

## 2019-11-27 VITALS
HEIGHT: 61 IN | SYSTOLIC BLOOD PRESSURE: 120 MMHG | DIASTOLIC BLOOD PRESSURE: 80 MMHG | WEIGHT: 224 LBS | BODY MASS INDEX: 42.29 KG/M2

## 2019-11-27 DIAGNOSIS — E66.01 MORBID OBESITY WITH BMI OF 40.0-44.9, ADULT (HCC): ICD-10-CM

## 2019-11-27 DIAGNOSIS — Z98.84 S/P GASTRIC BYPASS: ICD-10-CM

## 2019-11-27 DIAGNOSIS — R06.83 SNORING: Primary | ICD-10-CM

## 2019-11-27 PROCEDURE — 99213 OFFICE O/P EST LOW 20 MIN: CPT | Performed by: INTERNAL MEDICINE

## 2019-11-27 NOTE — PROGRESS NOTES
[address]  Dept: [phone]        Patient:  Kelton Leigh  :      1975  MRN:      [id number]    Referring Provider: Juliet Valenzuela MD Date   • BARIATRIC GASTRIC BYPASS LAPAROSCOPIC WENDI  N-Y N/A 2018    Performed by Kiley Garcia MD at [address] [place] [address] Dr   •      • CHOLECYSTECTOMY     • GASTRIC BYPASS,OBESE<100CM WENDI-EN-Y  2018    Dr Mason Betancourt, [address] [place] [address] bmi of 40.0-44.9, adult (hcc)    Plan     S/P yoana en Y 12/17/2018    Feels well    Continue vitamins    Keep moving    Follow up with RD and surgeon as scheduled    Umbilical pain noted. Tried binder, but got rash under pannus.   Possibly related to scar

## 2019-12-05 ENCOUNTER — OFFICE VISIT (OUTPATIENT)
Dept: FAMILY MEDICINE CLINIC | Facility: CLINIC | Age: 44
End: 2019-12-05
Payer: COMMERCIAL

## 2019-12-05 VITALS
HEIGHT: 61 IN | DIASTOLIC BLOOD PRESSURE: 53 MMHG | BODY MASS INDEX: 41.91 KG/M2 | OXYGEN SATURATION: 99 % | WEIGHT: 222 LBS | HEART RATE: 90 BPM | TEMPERATURE: 98 F | SYSTOLIC BLOOD PRESSURE: 131 MMHG

## 2019-12-05 DIAGNOSIS — J06.9 UPPER RESPIRATORY TRACT INFECTION, UNSPECIFIED TYPE: Primary | ICD-10-CM

## 2019-12-05 PROCEDURE — 99213 OFFICE O/P EST LOW 20 MIN: CPT | Performed by: PHYSICIAN ASSISTANT

## 2019-12-05 RX ORDER — INHALER, ASSIST DEVICES
SPACER (EA) MISCELLANEOUS
Qty: 1 DEVICE | Refills: 0 | Status: SHIPPED | OUTPATIENT
Start: 2019-12-05 | End: 2021-12-13

## 2019-12-05 RX ORDER — ALBUTEROL SULFATE 90 UG/1
2 AEROSOL, METERED RESPIRATORY (INHALATION) EVERY 4 HOURS PRN
Qty: 1 INHALER | Refills: 2 | Status: SHIPPED | OUTPATIENT
Start: 2019-12-05 | End: 2020-12-04

## 2019-12-05 RX ORDER — AZITHROMYCIN 250 MG/1
TABLET, FILM COATED ORAL
Qty: 6 TABLET | Refills: 0 | Status: SHIPPED | OUTPATIENT
Start: 2019-12-05 | End: 2020-03-09 | Stop reason: CLARIF

## 2019-12-05 NOTE — PATIENT INSTRUCTIONS
Z-pack  Take 2 tablets together today, then 1 tablet for the next 4 days. You take it for 5 days, but it lasts in your system for 10 days. How to use Chamber with Inhaler  1. Shake inhaler well  2. Attach to chamber  3. Take a deep breath in and out.

## 2019-12-05 NOTE — PROGRESS NOTES
HPI:    Patient ID: Bernell Bumpers is a 40year old female. Pt presents with cold symptoms for the past 5 days. Pt has had cough (green phlegm), sore throat. Congestion in chest noted. No ear symptoms. Chest tightness noted as well.  No chest pain or pa Patient Position: Sitting)   Pulse 90   Temp 98.4 °F (36.9 °C) (Oral)   Ht 5' 1\" (1.549 m)   Wt 222 lb (100.7 kg)   SpO2 99%   BMI 41.95 kg/m²   Body mass index is 41.95 kg/m².   PHYSICAL EXAM:   Physical Exam    Constitutional: She is oriented to person, inhaler   • Albuterol Sulfate  (90 Base) MCG/ACT Inhalation Aero Soln 1 Inhaler 2     Sig: Inhale 2 puffs into the lungs every 4 (four) hours as needed for Wheezing.    • azithromycin (ZITHROMAX Z-BROWN) 250 MG Oral Tab 6 tablet 0     Sig: To take 2 ta

## 2019-12-31 ENCOUNTER — DOCUMENTATION ONLY (OUTPATIENT)
Dept: SURGERY | Facility: CLINIC | Age: 44
End: 2019-12-31

## 2020-02-10 ENCOUNTER — LAB ENCOUNTER (OUTPATIENT)
Dept: LAB | Facility: HOSPITAL | Age: 45
End: 2020-02-10
Attending: PHYSICIAN ASSISTANT
Payer: COMMERCIAL

## 2020-02-10 ENCOUNTER — OFFICE VISIT (OUTPATIENT)
Dept: FAMILY MEDICINE CLINIC | Facility: CLINIC | Age: 45
End: 2020-02-10
Payer: COMMERCIAL

## 2020-02-10 ENCOUNTER — HOSPITAL ENCOUNTER (OUTPATIENT)
Dept: CT IMAGING | Facility: HOSPITAL | Age: 45
Discharge: HOME | End: 2020-02-10
Attending: PHYSICIAN ASSISTANT
Payer: COMMERCIAL

## 2020-02-10 ENCOUNTER — NURSE TRIAGE (OUTPATIENT)
Dept: OTHER | Age: 45
End: 2020-02-10

## 2020-02-10 VITALS
TEMPERATURE: 98 F | RESPIRATION RATE: 16 BRPM | OXYGEN SATURATION: 99 % | SYSTOLIC BLOOD PRESSURE: 120 MMHG | HEART RATE: 68 BPM | BODY MASS INDEX: 40.22 KG/M2 | DIASTOLIC BLOOD PRESSURE: 67 MMHG | HEIGHT: 61 IN | WEIGHT: 213 LBS

## 2020-02-10 DIAGNOSIS — R10.30 LOWER ABDOMINAL PAIN: ICD-10-CM

## 2020-02-10 DIAGNOSIS — Z98.84 S/P GASTRIC BYPASS: ICD-10-CM

## 2020-02-10 DIAGNOSIS — E66.01 MORBID OBESITY WITH BMI OF 40.0-44.9, ADULT (HCC): ICD-10-CM

## 2020-02-10 DIAGNOSIS — L30.4 INTERTRIGO: ICD-10-CM

## 2020-02-10 DIAGNOSIS — E55.9 VITAMIN D DEFICIENCY: ICD-10-CM

## 2020-02-10 DIAGNOSIS — R10.30 LOWER ABDOMINAL PAIN: Primary | ICD-10-CM

## 2020-02-10 LAB
ALBUMIN SERPL-MCNC: 3.5 G/DL (ref 3.4–5)
ALBUMIN/GLOB SERPL: 0.9 {RATIO} (ref 1–2)
ALP LIVER SERPL-CCNC: 106 U/L (ref 37–98)
ALT SERPL-CCNC: 45 U/L (ref 13–56)
ANION GAP SERPL CALC-SCNC: 6 MMOL/L (ref 0–18)
AST SERPL-CCNC: 40 U/L (ref 15–37)
BASOPHILS # BLD AUTO: 0.05 X10(3) UL (ref 0–0.2)
BASOPHILS NFR BLD AUTO: 0.8 %
BILIRUB SERPL-MCNC: 0.6 MG/DL (ref 0.1–2)
BUN BLD-MCNC: 12 MG/DL (ref 7–18)
BUN/CREAT SERPL: 19.4 (ref 10–20)
CALCIUM BLD-MCNC: 8.8 MG/DL (ref 8.5–10.1)
CHLORIDE SERPL-SCNC: 111 MMOL/L (ref 98–112)
CO2 SERPL-SCNC: 27 MMOL/L (ref 21–32)
CREAT BLD-MCNC: 0.62 MG/DL (ref 0.55–1.02)
DEPRECATED RDW RBC AUTO: 38.3 FL (ref 35.1–46.3)
EOSINOPHIL # BLD AUTO: 0.2 X10(3) UL (ref 0–0.7)
EOSINOPHIL NFR BLD AUTO: 3.1 %
ERYTHROCYTE [DISTWIDTH] IN BLOOD BY AUTOMATED COUNT: 12 % (ref 11–15)
GLOBULIN PLAS-MCNC: 3.9 G/DL (ref 2.8–4.4)
GLUCOSE BLD-MCNC: 82 MG/DL (ref 70–99)
HCT VFR BLD AUTO: 41.2 % (ref 35–48)
HGB BLD-MCNC: 14 G/DL (ref 12–16)
IMM GRANULOCYTES # BLD AUTO: 0.01 X10(3) UL (ref 0–1)
IMM GRANULOCYTES NFR BLD: 0.2 %
LYMPHOCYTES # BLD AUTO: 2.25 X10(3) UL (ref 1–4)
LYMPHOCYTES NFR BLD AUTO: 35.2 %
M PROTEIN MFR SERPL ELPH: 7.4 G/DL (ref 6.4–8.2)
MCH RBC QN AUTO: 30.1 PG (ref 26–34)
MCHC RBC AUTO-ENTMCNC: 34 G/DL (ref 31–37)
MCV RBC AUTO: 88.6 FL (ref 80–100)
MONOCYTES # BLD AUTO: 0.36 X10(3) UL (ref 0.1–1)
MONOCYTES NFR BLD AUTO: 5.6 %
NEUTROPHILS # BLD AUTO: 3.52 X10 (3) UL (ref 1.5–7.7)
NEUTROPHILS # BLD AUTO: 3.52 X10(3) UL (ref 1.5–7.7)
NEUTROPHILS NFR BLD AUTO: 55.1 %
OSMOLALITY SERPL CALC.SUM OF ELEC: 297 MOSM/KG (ref 275–295)
PATIENT FASTING Y/N/NP: NO
PLATELET # BLD AUTO: 171 10(3)UL (ref 150–450)
POTASSIUM SERPL-SCNC: 3.1 MMOL/L (ref 3.5–5.1)
RBC # BLD AUTO: 4.65 X10(6)UL (ref 3.8–5.3)
SODIUM SERPL-SCNC: 144 MMOL/L (ref 136–145)
VIT B12 SERPL-MCNC: 668 PG/ML (ref 193–986)
WBC # BLD AUTO: 6.4 X10(3) UL (ref 4–11)

## 2020-02-10 PROCEDURE — 36415 COLL VENOUS BLD VENIPUNCTURE: CPT

## 2020-02-10 PROCEDURE — 80053 COMPREHEN METABOLIC PANEL: CPT

## 2020-02-10 PROCEDURE — 74176 CT ABD & PELVIS W/O CONTRAST: CPT | Performed by: PHYSICIAN ASSISTANT

## 2020-02-10 PROCEDURE — 82607 VITAMIN B-12: CPT

## 2020-02-10 PROCEDURE — 82306 VITAMIN D 25 HYDROXY: CPT

## 2020-02-10 PROCEDURE — 85025 COMPLETE CBC W/AUTO DIFF WBC: CPT

## 2020-02-10 PROCEDURE — 99213 OFFICE O/P EST LOW 20 MIN: CPT | Performed by: PHYSICIAN ASSISTANT

## 2020-02-10 PROCEDURE — 84425 ASSAY OF VITAMIN B-1: CPT

## 2020-02-10 RX ORDER — POTASSIUM CHLORIDE 750 MG/1
10 TABLET, EXTENDED RELEASE ORAL 2 TIMES DAILY
Qty: 28 TABLET | Refills: 0 | Status: SHIPPED | OUTPATIENT
Start: 2020-02-10 | End: 2020-02-24

## 2020-02-10 NOTE — TELEPHONE ENCOUNTER
Action Requested: Summary for Provider     []  Critical Lab, Recommendations Needed  [] Need Additional Advice  []   FYI    []   Need Orders  [] Need Medications Sent to Pharmacy  []  Other     SUMMARY:   Patient calling having abdominal pain over 3 weeks.

## 2020-02-10 NOTE — PROGRESS NOTES
HPI:    Patient ID: Anjel White is a 40year old female. Patient presents for abdominal cramping for the past 3 weeks. Pain is located on the right flank. Reports no nausea, vomiting, and diarrhea. Alternates between diarrhea and constipation.  Harmony Callaway take 2 tablets by oral route on day one then 1 tablet daily for next 4 days.  (Patient not taking: Reported on 2/10/2020 ) 6 tablet 0     Allergies:  Radiology Contrast *    HIVES, ITCHING   /67 (BP Location: Right arm, Patient Position: Sitting, Cuff Referrals:  CT ABDOMEN+PELVIS(CPT=74176)         ZY#[id number]

## 2020-02-12 LAB
25(OH)D3 SERPL-MCNC: 22.9 NG/ML (ref 30–100)
VITAMIN B1 (THIAMINE), WHOLE B: 158 NMOL/L

## 2020-03-09 ENCOUNTER — OFFICE VISIT (OUTPATIENT)
Dept: FAMILY MEDICINE CLINIC | Facility: CLINIC | Age: 45
End: 2020-03-09
Payer: COMMERCIAL

## 2020-03-09 VITALS
BODY MASS INDEX: 39.65 KG/M2 | SYSTOLIC BLOOD PRESSURE: 132 MMHG | WEIGHT: 210 LBS | OXYGEN SATURATION: 98 % | TEMPERATURE: 99 F | HEART RATE: 82 BPM | HEIGHT: 61 IN | RESPIRATION RATE: 16 BRPM | DIASTOLIC BLOOD PRESSURE: 83 MMHG

## 2020-03-09 DIAGNOSIS — R05.9 COUGH: Primary | ICD-10-CM

## 2020-03-09 PROBLEM — L30.4 INTERTRIGO: Status: RESOLVED | Noted: 2019-03-06 | Resolved: 2020-03-09

## 2020-03-09 PROBLEM — E86.0 DEHYDRATION: Status: RESOLVED | Noted: 2019-01-23 | Resolved: 2020-03-09

## 2020-03-09 PROBLEM — Z01.818 PREOP TESTING: Status: RESOLVED | Noted: 2018-12-17 | Resolved: 2020-03-09

## 2020-03-09 PROCEDURE — 99213 OFFICE O/P EST LOW 20 MIN: CPT | Performed by: PHYSICIAN ASSISTANT

## 2020-03-09 RX ORDER — AZITHROMYCIN 250 MG/1
TABLET, FILM COATED ORAL
Qty: 6 TABLET | Refills: 0 | Status: SHIPPED | OUTPATIENT
Start: 2020-03-09 | End: 2020-03-19 | Stop reason: ALTCHOICE

## 2020-03-09 NOTE — PROGRESS NOTES
HPI:    Patient ID: Avi Katz is a 40year old female. Pt presents with cold symptoms for the past 2 weeks. Pt has had cough. No sore throat. Feels congested. No fevers. No ear pain noted. No shortness of breath or wheezing noted.  Pt has tried otc Temp 98.5 °F (36.9 °C) (Oral)   Resp 16   Ht 5' 1\" (1.549 m)   Wt 210 lb (95.3 kg)   LMP 02/24/2020 (Approximate)   SpO2 98%   BMI 39.68 kg/m²   Body mass index is 39.68 kg/m².   PHYSICAL EXAM:   Physical Exam    Constitutional: She is oriented to person,

## 2020-03-18 NOTE — PROGRESS NOTES
[facility], [facility] - Gastroenterology                                                                                                  Clinic Progress Note    Patient pr Past Surgical History:   Procedure Laterality Date   • BARIATRIC GASTRIC BYPASS LAPAROSCOPIC WENDI  N-Y N/A 2018    Performed by Shirlene Noonan MD at [address]   •      • CHOLECYSTECTOMY     • GASTRIC BYPASS,OBESE<100CM WENDI-EN-Y   mouth daily. Allergies:    Radiology Contrast *    HIVES, ITCHING    ROS:   all 10 systems were reviewed and were negative except as noted in the HPI    PHYSICAL EXAM:   Blood pressure 130/75, pulse 66, height 5' 1\" (1.549 m), weight 210 lb (95. bleeding, infection, abdominal and/or lower extremity swelling, confusion, liver cancer, and the potential for death, which they understand and she needs to notify me with any potential concerns for complications.  We discussed if there is cirrhosis the mos cardiopulmonary risks of anesthesia and all potentially leading to prolonged hospitalization or surgical intervention. I also mentioned the possibility of missed lesion. All questions were answered to the patient’s satisfaction.  The patient elected to proc

## 2020-03-19 ENCOUNTER — TELEPHONE (OUTPATIENT)
Dept: GASTROENTEROLOGY | Facility: CLINIC | Age: 45
End: 2020-03-19

## 2020-03-19 ENCOUNTER — OFFICE VISIT (OUTPATIENT)
Dept: GASTROENTEROLOGY | Facility: CLINIC | Age: 45
End: 2020-03-19
Payer: COMMERCIAL

## 2020-03-19 VITALS
SYSTOLIC BLOOD PRESSURE: 130 MMHG | DIASTOLIC BLOOD PRESSURE: 75 MMHG | HEIGHT: 61 IN | HEART RATE: 66 BPM | WEIGHT: 210 LBS | BODY MASS INDEX: 39.65 KG/M2

## 2020-03-19 DIAGNOSIS — R93.5 ABNORMAL CT OF THE ABDOMEN: ICD-10-CM

## 2020-03-19 DIAGNOSIS — K74.60 CIRRHOSIS OF LIVER WITHOUT ASCITES, UNSPECIFIED HEPATIC CIRRHOSIS TYPE (HCC): Primary | ICD-10-CM

## 2020-03-19 DIAGNOSIS — K62.5 RECTAL BLEEDING: ICD-10-CM

## 2020-03-19 PROCEDURE — 99214 OFFICE O/P EST MOD 30 MIN: CPT | Performed by: INTERNAL MEDICINE

## 2020-03-19 RX ORDER — POLYETHYLENE GLYCOL 3350, SODIUM CHLORIDE, SODIUM BICARBONATE, POTASSIUM CHLORIDE 420; 11.2; 5.72; 1.48 G/4L; G/4L; G/4L; G/4L
POWDER, FOR SOLUTION ORAL
Qty: 1 BOTTLE | Refills: 0 | Status: SHIPPED | OUTPATIENT
Start: 2020-03-19 | End: 2021-12-13

## 2020-03-19 NOTE — PATIENT INSTRUCTIONS
1. Schedule colonoscopy and EGD with monitored anesthesia care (MAC) with Dr. Chriss Valdivia at the [facility] or the [facility]    2.  bowel prep from pharmacy ([facility] )    3. Continue all medications for procedure    4.  Read all bowel prep instructions

## 2020-03-19 NOTE — TELEPHONE ENCOUNTER
Scheduled for:  Colonoscopy 64149 / EGD 09450  Provider Name:  Dr. Ford Rosado  Date:  5/6/2020 - per Dr. Ford Rosado  Location:  [facility]  Sedation:  MAC  Time:  10:45 am, (pt is aware that [facility] will call the day before to confirm arrival time)  Prep:  Trilyte  Me

## 2020-04-29 ENCOUNTER — TELEPHONE (OUTPATIENT)
Dept: GASTROENTEROLOGY | Facility: CLINIC | Age: 45
End: 2020-04-29

## 2020-04-29 NOTE — TELEPHONE ENCOUNTER
Patient was Rescheduled on Epic and OMP due to covid-19. Patient was advised, no further questions asked.

## 2020-04-29 NOTE — TELEPHONE ENCOUNTER
Rescheduled for:  Colonoscopy 82310 / EGD 02706  Provider Name:  Dr. Lyndon Donis  Date:From 5/6/2020 To: 07/08/2020  Location:  [facility]  Sedation:  MAC  Time: From:10:45 am To:8:00am  Prep:  Kelvin English/Allergies Reconciled?:  Physician reviewed  Diagnosis wi

## 2020-06-11 ENCOUNTER — OFFICE VISIT (OUTPATIENT)
Dept: SURGERY | Facility: CLINIC | Age: 45
End: 2020-06-11
Payer: COMMERCIAL

## 2020-06-11 VITALS
OXYGEN SATURATION: 100 % | SYSTOLIC BLOOD PRESSURE: 133 MMHG | HEIGHT: 61 IN | DIASTOLIC BLOOD PRESSURE: 66 MMHG | HEART RATE: 62 BPM | WEIGHT: 208.13 LBS | BODY MASS INDEX: 39.3 KG/M2

## 2020-06-11 DIAGNOSIS — Z51.81 ENCOUNTER FOR THERAPEUTIC DRUG MONITORING: ICD-10-CM

## 2020-06-11 DIAGNOSIS — E66.01 MORBID OBESITY WITH BMI OF 40.0-44.9, ADULT (HCC): ICD-10-CM

## 2020-06-11 DIAGNOSIS — Z98.84 S/P GASTRIC BYPASS: Primary | ICD-10-CM

## 2020-06-11 DIAGNOSIS — L83 ACANTHOSIS NIGRICANS: ICD-10-CM

## 2020-06-11 PROCEDURE — 99214 OFFICE O/P EST MOD 30 MIN: CPT | Performed by: INTERNAL MEDICINE

## 2020-06-11 NOTE — PROGRESS NOTES
[address], [address]  Dept: [phone]        Patient:  Louis Faye  :      1975  MRN:      [id number]    Referring Provider: Maria Luz Mast MD mouth daily. , Disp: , Rfl:   •  Multiple Vitamins-Minerals (BARIATRIC FUSION OR), Take 1 tablet by mouth daily.   , Disp: , Rfl:     Allergies:  Radiology Contrast Iodinated Dyes     Social History:  Social History    Tobacco Use      Smoking status: Former atraumatic, no cyanosis or edema  Pulses: 2+ and symmetric  Skin: irritation noted under skin folds  Neurologic: Grossly normal       ROS:    Constitutional: positive for fatigue  Respiratory: negative  Cardiovascular: negative  Gastrointestinal: positive

## 2020-07-06 ENCOUNTER — TELEPHONE (OUTPATIENT)
Dept: GASTROENTEROLOGY | Facility: CLINIC | Age: 45
End: 2020-07-06

## 2020-07-06 NOTE — TELEPHONE ENCOUNTER
Ok thanks for the update    Pat Guzman MD  [facility], [facility] - Gastroenterology  7/6/2020  4:56 PM

## 2020-07-06 NOTE — TELEPHONE ENCOUNTER
Dr. Santiago Izaguirre. ... This patient cancelled because \"I don't want anything shoved up my nose, up my butt and down my throat, I just don't want to the COVID test and have something shoved up my nose\".  She also stated that once this is all over then s

## 2020-07-06 NOTE — TELEPHONE ENCOUNTER
Scheduled for:  Colonoscopy 94963 and EGD [address]  Provider Name:      Dr. Ritika Slaughter  Date:  7/8/20  Location:   [facility]  Sedation:   MAC  Time:  0800  Prep:  Dennis Bump  Meds/Allergies Reconciled?:  Physician reviewed   Diagnosis with codes:  Cirrhosis of the live

## 2020-08-06 ENCOUNTER — TELEPHONE (OUTPATIENT)
Dept: GASTROENTEROLOGY | Facility: CLINIC | Age: 45
End: 2020-08-06

## 2020-09-03 ENCOUNTER — OFFICE VISIT (OUTPATIENT)
Dept: SURGERY | Facility: CLINIC | Age: 45
End: 2020-09-03
Payer: COMMERCIAL

## 2020-09-03 VITALS
SYSTOLIC BLOOD PRESSURE: 100 MMHG | DIASTOLIC BLOOD PRESSURE: 70 MMHG | WEIGHT: 207 LBS | BODY MASS INDEX: 39.08 KG/M2 | HEIGHT: 61 IN

## 2020-09-03 DIAGNOSIS — E55.9 VITAMIN D DEFICIENCY: ICD-10-CM

## 2020-09-03 DIAGNOSIS — Z98.84 S/P GASTRIC BYPASS: Primary | ICD-10-CM

## 2020-09-03 DIAGNOSIS — Z98.84 S/P GASTRIC BYPASS: ICD-10-CM

## 2020-09-03 DIAGNOSIS — K76.0 FATTY LIVER: Primary | ICD-10-CM

## 2020-09-03 DIAGNOSIS — E66.9 OBESITY (BMI 30-39.9): ICD-10-CM

## 2020-09-03 PROCEDURE — 3074F SYST BP LT 130 MM HG: CPT | Performed by: INTERNAL MEDICINE

## 2020-09-03 PROCEDURE — 3008F BODY MASS INDEX DOCD: CPT | Performed by: INTERNAL MEDICINE

## 2020-09-03 PROCEDURE — 3078F DIAST BP <80 MM HG: CPT | Performed by: INTERNAL MEDICINE

## 2020-09-03 PROCEDURE — 99214 OFFICE O/P EST MOD 30 MIN: CPT | Performed by: INTERNAL MEDICINE

## 2020-09-03 NOTE — PROGRESS NOTES
[address], [address]  Dept: [phone]        Patient:  Avi Katz  :      1975  MRN:      [id number]    Referring Provider: Tamar Jason MD FUSION OR), Take 1 tablet by mouth daily.   , Disp: , Rfl:     Allergies:  Radiology Contrast Iodinated Dyes     Social History:  Social History    Tobacco Use      Smoking status: Former Smoker        Packs/day: 0.50        Years: 10.00        Pack years: symmetric  Skin: irritation noted under skin folds  Neurologic: Grossly normal       ROS:    Constitutional: positive for fatigue  Respiratory: negative  Cardiovascular: negative  Gastrointestinal: positive for abdominal pain  Musculoskeletal:positive for

## 2020-09-15 ENCOUNTER — OFFICE VISIT (OUTPATIENT)
Dept: SURGERY | Facility: CLINIC | Age: 45
End: 2020-09-15
Payer: COMMERCIAL

## 2020-09-15 VITALS — WEIGHT: 205.31 LBS | BODY MASS INDEX: 38.76 KG/M2 | HEIGHT: 61 IN

## 2020-09-15 DIAGNOSIS — E66.09 CLASS 2 OBESITY DUE TO EXCESS CALORIES WITH BODY MASS INDEX (BMI) OF 38.0 TO 38.9 IN ADULT, UNSPECIFIED WHETHER SERIOUS COMORBIDITY PRESENT: Primary | ICD-10-CM

## 2020-09-15 PROCEDURE — 97803 MED NUTRITION INDIV SUBSEQ: CPT | Performed by: DIETITIAN, REGISTERED

## 2020-09-15 PROCEDURE — 3008F BODY MASS INDEX DOCD: CPT | Performed by: DIETITIAN, REGISTERED

## 2020-09-15 NOTE — PROGRESS NOTES
[address] [address]  Dept: [phone]  Loc: [phone]    09/15/20      Bariatric Follow-up Nutrition Session    Meghan Diggs is a 39year old female.      Ass 03/21/2019     (H) 03/21/2019    VLDL 24 03/21/2019    NONHDLC 135 (H) 03/21/2019    CALCNONHDL 147 (H) 06/28/2016        Vitamins/Minerals:  Lab Results   Component Value Date    B12 668 02/10/2020     Lab Results   Component Value Date    VITD 22. steak/meat Taco meat sometimes with spinach, avocado, fruits Taco meat Taco meat               Total Calories:  ~5695-2885 calories  Excessive in: other: red meat  Inadequate in:  milk, fruits and vegetables     Patient has made some modifications and adju of fluid, 100g of CHO - get more fluid in per day  3. Swap 1 meal of taco meat for a protein shake  4.  Start exercising again - YouTube videos        Monitor/Evaluate     Anthropometric measurements, Food/fluid intake/choices, Food intolerances, Activity l

## 2021-08-24 ENCOUNTER — OFFICE VISIT (OUTPATIENT)
Dept: FAMILY MEDICINE CLINIC | Facility: CLINIC | Age: 46
End: 2021-08-24
Payer: COMMERCIAL

## 2021-08-24 VITALS
RESPIRATION RATE: 18 BRPM | HEIGHT: 61 IN | BODY MASS INDEX: 41.72 KG/M2 | OXYGEN SATURATION: 100 % | SYSTOLIC BLOOD PRESSURE: 129 MMHG | HEART RATE: 62 BPM | DIASTOLIC BLOOD PRESSURE: 77 MMHG | WEIGHT: 221 LBS

## 2021-08-24 DIAGNOSIS — R45.86 MOOD DISTURBANCE: ICD-10-CM

## 2021-08-24 DIAGNOSIS — Z00.00 ROUTINE GENERAL MEDICAL EXAMINATION AT A HEALTH CARE FACILITY: Primary | ICD-10-CM

## 2021-08-24 DIAGNOSIS — E66.01 MORBID OBESITY WITH BMI OF 40.0-44.9, ADULT (HCC): ICD-10-CM

## 2021-08-24 DIAGNOSIS — Z12.31 SCREENING MAMMOGRAM, ENCOUNTER FOR: ICD-10-CM

## 2021-08-24 PROCEDURE — 99396 PREV VISIT EST AGE 40-64: CPT | Performed by: PHYSICIAN ASSISTANT

## 2021-08-24 PROCEDURE — 3074F SYST BP LT 130 MM HG: CPT | Performed by: PHYSICIAN ASSISTANT

## 2021-08-24 PROCEDURE — 3078F DIAST BP <80 MM HG: CPT | Performed by: PHYSICIAN ASSISTANT

## 2021-08-24 PROCEDURE — 3008F BODY MASS INDEX DOCD: CPT | Performed by: PHYSICIAN ASSISTANT

## 2021-08-24 NOTE — PROGRESS NOTES
HPI:    Patient ID: Ramon Wynn is a 55year old female. Patient is here for routine physical exam. No acute issues. Significant chronic medical problems include morbid obesity. Patient is requesting testing. Diet and exercise have been fair.  She is As directed.  1 Bottle 0   • Spacer/Aero-Holding Chambers (AEROCHAMBER MINI CHAMBER) Does not apply Device Pt can use aerochamber with each use of the inhaler 1 Device 0   • Nystatin 269482 UNIT/GM External Powder Apply 1 Application topically 4 (four) time are normal. There is no distension. Palpations: Abdomen is soft. Tenderness: There is no abdominal tenderness. There is no guarding or rebound. Musculoskeletal:         General: Normal range of motion.       Cervical back: Normal range of motion agreeable to plan and will comply with discussion above.          Orders Placed This Encounter      CBC With Differential With Platelet      Comp Metabolic Panel (14)      Lipid Panel      TSH W Reflex To Free T4      Meds This Visit:  Requested Prescriptio

## 2021-08-25 ENCOUNTER — LAB ENCOUNTER (OUTPATIENT)
Dept: LAB | Facility: HOSPITAL | Age: 46
End: 2021-08-25
Attending: PHYSICIAN ASSISTANT
Payer: COMMERCIAL

## 2021-08-25 DIAGNOSIS — Z98.84 S/P GASTRIC BYPASS: ICD-10-CM

## 2021-08-25 DIAGNOSIS — K76.0 FATTY LIVER: ICD-10-CM

## 2021-08-25 DIAGNOSIS — Z00.00 ROUTINE GENERAL MEDICAL EXAMINATION AT A HEALTH CARE FACILITY: ICD-10-CM

## 2021-08-25 DIAGNOSIS — E55.9 VITAMIN D DEFICIENCY: ICD-10-CM

## 2021-08-25 DIAGNOSIS — E66.9 OBESITY (BMI 30-39.9): ICD-10-CM

## 2021-08-25 LAB
ALBUMIN SERPL-MCNC: 3.2 G/DL (ref 3.4–5)
ALBUMIN/GLOB SERPL: 1 {RATIO} (ref 1–2)
ALP LIVER SERPL-CCNC: 82 U/L
ALT SERPL-CCNC: 32 U/L
ANION GAP SERPL CALC-SCNC: 5 MMOL/L (ref 0–18)
AST SERPL-CCNC: 26 U/L (ref 15–37)
BASOPHILS # BLD AUTO: 0.04 X10(3) UL (ref 0–0.2)
BASOPHILS NFR BLD AUTO: 0.6 %
BILIRUB SERPL-MCNC: 0.6 MG/DL (ref 0.1–2)
BUN BLD-MCNC: 10 MG/DL (ref 7–18)
BUN/CREAT SERPL: 19.2 (ref 10–20)
CALCIUM BLD-MCNC: 8.4 MG/DL (ref 8.5–10.1)
CHLORIDE SERPL-SCNC: 110 MMOL/L (ref 98–112)
CHOLEST SMN-MCNC: 153 MG/DL (ref ?–200)
CO2 SERPL-SCNC: 27 MMOL/L (ref 21–32)
CREAT BLD-MCNC: 0.52 MG/DL
DEPRECATED RDW RBC AUTO: 37.2 FL (ref 35.1–46.3)
EOSINOPHIL # BLD AUTO: 0.18 X10(3) UL (ref 0–0.7)
EOSINOPHIL NFR BLD AUTO: 2.8 %
ERYTHROCYTE [DISTWIDTH] IN BLOOD BY AUTOMATED COUNT: 11.5 % (ref 11–15)
GLOBULIN PLAS-MCNC: 3.3 G/DL (ref 2.8–4.4)
GLUCOSE BLD-MCNC: 86 MG/DL (ref 70–99)
HCT VFR BLD AUTO: 39.3 %
HDLC SERPL-MCNC: 44 MG/DL (ref 40–59)
HGB BLD-MCNC: 13.1 G/DL
IMM GRANULOCYTES # BLD AUTO: 0.02 X10(3) UL (ref 0–1)
IMM GRANULOCYTES NFR BLD: 0.3 %
LDLC SERPL CALC-MCNC: 90 MG/DL (ref ?–100)
LYMPHOCYTES # BLD AUTO: 2.21 X10(3) UL (ref 1–4)
LYMPHOCYTES NFR BLD AUTO: 34.8 %
M PROTEIN MFR SERPL ELPH: 6.5 G/DL (ref 6.4–8.2)
MCH RBC QN AUTO: 29.8 PG (ref 26–34)
MCHC RBC AUTO-ENTMCNC: 33.3 G/DL (ref 31–37)
MCV RBC AUTO: 89.3 FL
MONOCYTES # BLD AUTO: 0.4 X10(3) UL (ref 0.1–1)
MONOCYTES NFR BLD AUTO: 6.3 %
NEUTROPHILS # BLD AUTO: 3.5 X10 (3) UL (ref 1.5–7.7)
NEUTROPHILS # BLD AUTO: 3.5 X10(3) UL (ref 1.5–7.7)
NEUTROPHILS NFR BLD AUTO: 55.2 %
NONHDLC SERPL-MCNC: 109 MG/DL (ref ?–130)
OSMOLALITY SERPL CALC.SUM OF ELEC: 292 MOSM/KG (ref 275–295)
PATIENT FASTING Y/N/NP: YES
PATIENT FASTING Y/N/NP: YES
PLATELET # BLD AUTO: 192 10(3)UL (ref 150–450)
POTASSIUM SERPL-SCNC: 3.6 MMOL/L (ref 3.5–5.1)
RBC # BLD AUTO: 4.4 X10(6)UL
SODIUM SERPL-SCNC: 142 MMOL/L (ref 136–145)
TRIGL SERPL-MCNC: 106 MG/DL (ref 30–149)
TSI SER-ACNC: 1.36 MIU/ML (ref 0.36–3.74)
VIT B12 SERPL-MCNC: 723 PG/ML (ref 193–986)
VIT D+METAB SERPL-MCNC: 25.8 NG/ML (ref 30–100)
VLDLC SERPL CALC-MCNC: 17 MG/DL (ref 0–30)
WBC # BLD AUTO: 6.4 X10(3) UL (ref 4–11)

## 2021-08-25 PROCEDURE — 85025 COMPLETE CBC W/AUTO DIFF WBC: CPT

## 2021-08-25 PROCEDURE — 82607 VITAMIN B-12: CPT

## 2021-08-25 PROCEDURE — 84425 ASSAY OF VITAMIN B-1: CPT

## 2021-08-25 PROCEDURE — 84443 ASSAY THYROID STIM HORMONE: CPT | Performed by: PHYSICIAN ASSISTANT

## 2021-08-25 PROCEDURE — 36415 COLL VENOUS BLD VENIPUNCTURE: CPT

## 2021-08-25 PROCEDURE — 80053 COMPREHEN METABOLIC PANEL: CPT

## 2021-08-25 PROCEDURE — 82306 VITAMIN D 25 HYDROXY: CPT

## 2021-08-25 PROCEDURE — 82397 CHEMILUMINESCENT ASSAY: CPT

## 2021-08-25 PROCEDURE — 80061 LIPID PANEL: CPT

## 2021-08-26 ENCOUNTER — TELEPHONE (OUTPATIENT)
Dept: FAMILY MEDICINE CLINIC | Facility: CLINIC | Age: 46
End: 2021-08-26

## 2021-08-26 NOTE — TELEPHONE ENCOUNTER
Patient calling for results from blood work completed yesterday. Advised:  The results are sent to my chart immediately however, the provider needs some time to review and once reviewed will place a comment regarding the results and then, we contact you wit

## 2021-08-29 LAB — VITAMIN B1 (THIAMINE), WHOLE B: 172 NMOL/L

## 2021-08-30 LAB — LEPTIN: 52.2 NG/ML

## 2021-08-31 ENCOUNTER — HOSPITAL ENCOUNTER (OUTPATIENT)
Dept: MAMMOGRAPHY | Age: 46
Discharge: HOME | End: 2021-08-31
Attending: PHYSICIAN ASSISTANT
Payer: COMMERCIAL

## 2021-08-31 DIAGNOSIS — Z12.31 SCREENING MAMMOGRAM, ENCOUNTER FOR: ICD-10-CM

## 2021-08-31 PROCEDURE — 77067 SCR MAMMO BI INCL CAD: CPT | Performed by: PHYSICIAN ASSISTANT

## 2021-08-31 PROCEDURE — 77063 BREAST TOMOSYNTHESIS BI: CPT | Performed by: PHYSICIAN ASSISTANT

## 2021-12-13 ENCOUNTER — OFFICE VISIT (OUTPATIENT)
Dept: FAMILY MEDICINE CLINIC | Facility: CLINIC | Age: 46
End: 2021-12-13
Payer: COMMERCIAL

## 2021-12-13 VITALS
DIASTOLIC BLOOD PRESSURE: 78 MMHG | OXYGEN SATURATION: 98 % | HEIGHT: 61 IN | RESPIRATION RATE: 18 BRPM | HEART RATE: 82 BPM | WEIGHT: 217 LBS | BODY MASS INDEX: 40.97 KG/M2 | SYSTOLIC BLOOD PRESSURE: 130 MMHG

## 2021-12-13 DIAGNOSIS — R05.9 COUGH: Primary | ICD-10-CM

## 2021-12-13 PROCEDURE — 3078F DIAST BP <80 MM HG: CPT | Performed by: FAMILY MEDICINE

## 2021-12-13 PROCEDURE — 3008F BODY MASS INDEX DOCD: CPT | Performed by: FAMILY MEDICINE

## 2021-12-13 PROCEDURE — 99214 OFFICE O/P EST MOD 30 MIN: CPT | Performed by: FAMILY MEDICINE

## 2021-12-13 PROCEDURE — 3075F SYST BP GE 130 - 139MM HG: CPT | Performed by: FAMILY MEDICINE

## 2021-12-13 RX ORDER — AZITHROMYCIN 250 MG/1
TABLET, FILM COATED ORAL
Qty: 6 TABLET | Refills: 0 | Status: SHIPPED | OUTPATIENT
Start: 2021-12-13 | End: 2021-12-18

## 2021-12-13 RX ORDER — PREDNISONE 10 MG/1
TABLET ORAL
Qty: 18 TABLET | Refills: 0 | Status: SHIPPED | OUTPATIENT
Start: 2021-12-13

## 2021-12-15 ENCOUNTER — TELEPHONE (OUTPATIENT)
Dept: FAMILY MEDICINE CLINIC | Facility: CLINIC | Age: 46
End: 2021-12-15

## 2021-12-15 NOTE — TELEPHONE ENCOUNTER
Patient advised that COVID PCR results were negative. Patient verbalized understanding. Collected 12/13/2021  5:19 PM     Status: Final result     Dx: Cough    Specimen Information: Nares;  Other        Component   Ref Range & Units    SARS-CoV-2 Noemi

## 2021-12-15 NOTE — PROGRESS NOTES
Subjective:   Patient ID: Arturo Duarte is a 55year old female. HPI  Patient presents with:  Chest Congestion: Patient present with chest congestion x 2 weeks    History/Other:   Review of Systems   Constitutional: Negative.     HENT: Positive for con Oral Tab 18 tablet 0     Sig: Take 3 tabs daily for 3 days then 2 tabs daily for 3 days then 1 tab daily for 3 days       Imaging & Referrals:  None

## 2022-02-17 ENCOUNTER — TELEPHONE (OUTPATIENT)
Dept: SURGERY | Facility: CLINIC | Age: 47
End: 2022-02-17

## 2022-02-17 NOTE — TELEPHONE ENCOUNTER
Spoke with pt and reminded to schedule annual f/up appointment with surgeon and Roxi Zheng. Pt verbalized understanding and agreeable.

## 2022-02-22 ENCOUNTER — OFFICE VISIT (OUTPATIENT)
Dept: SURGERY | Facility: CLINIC | Age: 47
End: 2022-02-22
Payer: COMMERCIAL

## 2022-02-22 VITALS — HEIGHT: 61.2 IN | BODY MASS INDEX: 39.91 KG/M2 | WEIGHT: 211.38 LBS

## 2022-02-22 DIAGNOSIS — Z98.84 S/P GASTRIC BYPASS: ICD-10-CM

## 2022-02-22 DIAGNOSIS — E66.9 OBESITY (BMI 30-39.9): ICD-10-CM

## 2022-02-22 DIAGNOSIS — E66.01 MORBID OBESITY WITH BMI OF 40.0-44.9, ADULT (HCC): ICD-10-CM

## 2022-02-22 PROCEDURE — 97803 MED NUTRITION INDIV SUBSEQ: CPT

## 2022-02-22 PROCEDURE — 3008F BODY MASS INDEX DOCD: CPT

## 2022-02-22 NOTE — PATIENT INSTRUCTIONS
Recommendations/goals:      1. Keep a food record, My Net Diary. 2.  Do not use a straw. 3.  Increase meals/snacks to 4-6 times per day. Include protein and produce. Aim for 57-71 grams of protein per day. 4.  Continue exercise. 5.  Begin  for resistance/strength training.

## 2022-05-12 ENCOUNTER — TELEPHONE (OUTPATIENT)
Dept: SURGERY | Facility: CLINIC | Age: 47
End: 2022-05-12

## 2022-06-15 ENCOUNTER — TELEMEDICINE (OUTPATIENT)
Dept: FAMILY MEDICINE CLINIC | Facility: CLINIC | Age: 47
End: 2022-06-15
Payer: COMMERCIAL

## 2022-06-15 DIAGNOSIS — J06.9 UPPER RESPIRATORY INFECTION, ACUTE: Primary | ICD-10-CM

## 2022-06-15 PROCEDURE — 99213 OFFICE O/P EST LOW 20 MIN: CPT | Performed by: FAMILY MEDICINE

## 2022-06-15 RX ORDER — BENZONATATE 200 MG/1
200 CAPSULE ORAL 3 TIMES DAILY PRN
Qty: 20 CAPSULE | Refills: 0 | Status: SHIPPED | OUTPATIENT
Start: 2022-06-15

## 2022-06-15 RX ORDER — AZITHROMYCIN 250 MG/1
TABLET, FILM COATED ORAL
Qty: 6 TABLET | Refills: 0 | Status: SHIPPED | OUTPATIENT
Start: 2022-06-15 | End: 2022-06-20

## 2023-04-13 ENCOUNTER — HOSPITAL ENCOUNTER (OUTPATIENT)
Dept: MAMMOGRAPHY | Age: 48
Discharge: HOME | End: 2023-04-13
Attending: ADVANCED PRACTICE MIDWIFE
Payer: COMMERCIAL

## 2023-04-13 ENCOUNTER — OFFICE VISIT (OUTPATIENT)
Dept: OBGYN CLINIC | Facility: CLINIC | Age: 48
End: 2023-04-13

## 2023-04-13 VITALS
DIASTOLIC BLOOD PRESSURE: 77 MMHG | HEART RATE: 77 BPM | HEIGHT: 61 IN | SYSTOLIC BLOOD PRESSURE: 135 MMHG | WEIGHT: 203 LBS | BODY MASS INDEX: 38.33 KG/M2

## 2023-04-13 DIAGNOSIS — Z11.3 SCREEN FOR STD (SEXUALLY TRANSMITTED DISEASE): ICD-10-CM

## 2023-04-13 DIAGNOSIS — Z01.419 ENCOUNTER FOR ANNUAL ROUTINE GYNECOLOGICAL EXAMINATION: Primary | ICD-10-CM

## 2023-04-13 DIAGNOSIS — Z12.31 BREAST CANCER SCREENING BY MAMMOGRAM: ICD-10-CM

## 2023-04-13 DIAGNOSIS — Z01.419 SMEAR, VAGINAL, AS PART OF ROUTINE GYNECOLOGICAL EXAMINATION: ICD-10-CM

## 2023-04-13 DIAGNOSIS — N89.8 VAGINAL CYST: ICD-10-CM

## 2023-04-13 DIAGNOSIS — E66.9 OBESITY (BMI 30-39.9): ICD-10-CM

## 2023-04-13 DIAGNOSIS — Z80.41 FAMILY HISTORY OF OVARIAN CANCER: ICD-10-CM

## 2023-04-13 DIAGNOSIS — Z12.72 SMEAR, VAGINAL, AS PART OF ROUTINE GYNECOLOGICAL EXAMINATION: ICD-10-CM

## 2023-04-13 DIAGNOSIS — Z32.00 PREGNANCY EXAMINATION OR TEST, PREGNANCY UNCONFIRMED: ICD-10-CM

## 2023-04-13 LAB
CONTROL LINE PRESENT WITH A CLEAR BACKGROUND (YES/NO): YES YES/NO
PREGNANCY TEST, URINE: NEGATIVE

## 2023-04-13 PROCEDURE — 77063 BREAST TOMOSYNTHESIS BI: CPT | Performed by: ADVANCED PRACTICE MIDWIFE

## 2023-04-13 PROCEDURE — 99396 PREV VISIT EST AGE 40-64: CPT | Performed by: ADVANCED PRACTICE MIDWIFE

## 2023-04-13 PROCEDURE — 3075F SYST BP GE 130 - 139MM HG: CPT | Performed by: ADVANCED PRACTICE MIDWIFE

## 2023-04-13 PROCEDURE — 3008F BODY MASS INDEX DOCD: CPT | Performed by: ADVANCED PRACTICE MIDWIFE

## 2023-04-13 PROCEDURE — 77067 SCR MAMMO BI INCL CAD: CPT | Performed by: ADVANCED PRACTICE MIDWIFE

## 2023-04-13 PROCEDURE — 81025 URINE PREGNANCY TEST: CPT | Performed by: ADVANCED PRACTICE MIDWIFE

## 2023-04-13 PROCEDURE — 3078F DIAST BP <80 MM HG: CPT | Performed by: ADVANCED PRACTICE MIDWIFE

## 2023-04-14 ENCOUNTER — LAB ENCOUNTER (OUTPATIENT)
Dept: LAB | Facility: HOSPITAL | Age: 48
End: 2023-04-14
Attending: ADVANCED PRACTICE MIDWIFE
Payer: COMMERCIAL

## 2023-04-14 DIAGNOSIS — E66.9 OBESITY (BMI 30-39.9): ICD-10-CM

## 2023-04-14 DIAGNOSIS — Z01.419 ENCOUNTER FOR ANNUAL ROUTINE GYNECOLOGICAL EXAMINATION: ICD-10-CM

## 2023-04-14 LAB
ALBUMIN SERPL-MCNC: 3.5 G/DL (ref 3.4–5)
ALBUMIN/GLOB SERPL: 0.9 {RATIO} (ref 1–2)
ALP LIVER SERPL-CCNC: 83 U/L
ALT SERPL-CCNC: 28 U/L
ANION GAP SERPL CALC-SCNC: 6 MMOL/L (ref 0–18)
AST SERPL-CCNC: 27 U/L (ref 15–37)
BILIRUB SERPL-MCNC: 0.6 MG/DL (ref 0.1–2)
BUN BLD-MCNC: 15 MG/DL (ref 7–18)
BUN/CREAT SERPL: 20.3 (ref 10–20)
CALCIUM BLD-MCNC: 9 MG/DL (ref 8.5–10.1)
CHLORIDE SERPL-SCNC: 110 MMOL/L (ref 98–112)
CHOLEST SERPL-MCNC: 170 MG/DL (ref ?–200)
CO2 SERPL-SCNC: 27 MMOL/L (ref 21–32)
CREAT BLD-MCNC: 0.74 MG/DL
DEPRECATED RDW RBC AUTO: 37.5 FL (ref 35.1–46.3)
ERYTHROCYTE [DISTWIDTH] IN BLOOD BY AUTOMATED COUNT: 12 % (ref 11–15)
EST. AVERAGE GLUCOSE BLD GHB EST-MCNC: 94 MG/DL (ref 68–126)
FASTING PATIENT LIPID ANSWER: YES
FASTING STATUS PATIENT QL REPORTED: YES
GFR SERPLBLD BASED ON 1.73 SQ M-ARVRAT: 100 ML/MIN/1.73M2 (ref 60–?)
GLOBULIN PLAS-MCNC: 3.8 G/DL (ref 2.8–4.4)
GLUCOSE BLD-MCNC: 89 MG/DL (ref 70–99)
HBA1C MFR BLD: 4.9 % (ref ?–5.7)
HCT VFR BLD AUTO: 44 %
HDLC SERPL-MCNC: 48 MG/DL (ref 40–59)
HGB BLD-MCNC: 14.5 G/DL
HPV I/H RISK 1 DNA SPEC QL NAA+PROBE: NEGATIVE
LDLC SERPL CALC-MCNC: 98 MG/DL (ref ?–100)
MCH RBC QN AUTO: 28.2 PG (ref 26–34)
MCHC RBC AUTO-ENTMCNC: 33 G/DL (ref 31–37)
MCV RBC AUTO: 85.4 FL
NONHDLC SERPL-MCNC: 122 MG/DL (ref ?–130)
OSMOLALITY SERPL CALC.SUM OF ELEC: 296 MOSM/KG (ref 275–295)
PLATELET # BLD AUTO: 196 10(3)UL (ref 150–450)
POTASSIUM SERPL-SCNC: 3.3 MMOL/L (ref 3.5–5.1)
PROT SERPL-MCNC: 7.3 G/DL (ref 6.4–8.2)
RBC # BLD AUTO: 5.15 X10(6)UL
SODIUM SERPL-SCNC: 143 MMOL/L (ref 136–145)
TRIGL SERPL-MCNC: 138 MG/DL (ref 30–149)
TSI SER-ACNC: 0.96 MIU/ML (ref 0.36–3.74)
VLDLC SERPL CALC-MCNC: 23 MG/DL (ref 0–30)
WBC # BLD AUTO: 6 X10(3) UL (ref 4–11)

## 2023-04-14 PROCEDURE — 36415 COLL VENOUS BLD VENIPUNCTURE: CPT

## 2023-04-14 PROCEDURE — 85027 COMPLETE CBC AUTOMATED: CPT

## 2023-04-14 PROCEDURE — 80053 COMPREHEN METABOLIC PANEL: CPT

## 2023-04-14 PROCEDURE — 84443 ASSAY THYROID STIM HORMONE: CPT

## 2023-04-14 PROCEDURE — 83036 HEMOGLOBIN GLYCOSYLATED A1C: CPT

## 2023-04-14 PROCEDURE — 80061 LIPID PANEL: CPT

## 2023-04-18 ENCOUNTER — TELEPHONE (OUTPATIENT)
Dept: OBGYN CLINIC | Facility: CLINIC | Age: 48
End: 2023-04-18

## 2023-04-18 NOTE — TELEPHONE ENCOUNTER
----- Message from Cedric Sanchez CNM sent at 4/18/2023  9:33 AM CDT -----  Please call patient. Potassium is a little low. She should increase potassium rich foods and follow up with PCP. All other labs look good. Thanks!

## 2023-04-18 NOTE — TELEPHONE ENCOUNTER
----- Message from Lizandro Dempsey CNM sent at 4/17/2023  9:38 PM CDT -----  Please call patient. Mammogram was normal. She will be due for next mammogram in 1 year. Thanks!

## 2023-04-18 NOTE — TELEPHONE ENCOUNTER
Notified pt of results & instructions per Gareth Chavez. Reviewed Potassium Rich foods list & copy mailed to pt. Offered pt to sign up for MyChart but pt declined.  Pt verbalized an understanding & agrees w/ plan

## 2023-04-18 NOTE — TELEPHONE ENCOUNTER
Notified pt of results & instructions per Nuñez Rosangela.  Pt verbalized an understanding & agrees w/ plan

## 2023-05-01 ENCOUNTER — TELEPHONE (OUTPATIENT)
Dept: OBGYN CLINIC | Facility: CLINIC | Age: 48
End: 2023-05-01

## 2023-05-01 NOTE — TELEPHONE ENCOUNTER
----- Message from Demetrius Hunter CNM sent at 4/29/2023 11:46 AM CDT -----  Please call patient and let her know her pap was normal and HPV negative. Next pap is due in 5 years. Thanks!

## 2023-05-05 ENCOUNTER — TELEPHONE (OUTPATIENT)
Dept: INTERNAL MEDICINE CLINIC | Facility: CLINIC | Age: 48
End: 2023-05-05

## 2023-05-05 NOTE — TELEPHONE ENCOUNTER
Spoke with pt and congratulated on 4 year post op of RYGB and reminded to schedule annual visits with bariatrician and dietitian. Pt agreeable and thanked writer for call reminder.

## 2024-01-04 ENCOUNTER — OFFICE VISIT (OUTPATIENT)
Dept: FAMILY MEDICINE CLINIC | Facility: CLINIC | Age: 49
End: 2024-01-04

## 2024-01-04 VITALS
HEIGHT: 61 IN | HEART RATE: 61 BPM | DIASTOLIC BLOOD PRESSURE: 62 MMHG | OXYGEN SATURATION: 100 % | RESPIRATION RATE: 16 BRPM | BODY MASS INDEX: 40.66 KG/M2 | SYSTOLIC BLOOD PRESSURE: 149 MMHG | WEIGHT: 215.38 LBS

## 2024-01-04 DIAGNOSIS — Z12.11 COLON CANCER SCREENING: Primary | ICD-10-CM

## 2024-01-04 DIAGNOSIS — Z00.00 ANNUAL PHYSICAL EXAM: ICD-10-CM

## 2024-01-04 DIAGNOSIS — F17.200 TOBACCO DEPENDENCE: ICD-10-CM

## 2024-01-04 PROCEDURE — 3077F SYST BP >= 140 MM HG: CPT | Performed by: FAMILY MEDICINE

## 2024-01-04 PROCEDURE — 3078F DIAST BP <80 MM HG: CPT | Performed by: FAMILY MEDICINE

## 2024-01-04 PROCEDURE — 99396 PREV VISIT EST AGE 40-64: CPT | Performed by: FAMILY MEDICINE

## 2024-01-04 PROCEDURE — 3008F BODY MASS INDEX DOCD: CPT | Performed by: FAMILY MEDICINE

## 2024-01-04 RX ORDER — VARENICLINE TARTRATE 1 MG/1
1 TABLET, FILM COATED ORAL 2 TIMES DAILY
Qty: 180 TABLET | Refills: 0 | Status: SHIPPED | OUTPATIENT
Start: 2024-01-04

## 2024-01-04 RX ORDER — VARENICLINE TARTRATE 0.5 (11)-1
KIT ORAL
Qty: 1 EACH | Refills: 0 | Status: SHIPPED | OUTPATIENT
Start: 2024-01-04 | End: 2024-02-03

## 2024-01-04 NOTE — PROGRESS NOTES
Subjective:   Patient ID: Enma Martinez is a 48 year old female.    Here for annual physical   Doing well   Started smoking again would like to get chantix           History/Other:   Review of Systems  Constitutional: Negative.  Negative for activity change, appetite change, diaphoresis and fatigue.     Respiratory: Negative.  Negative for apnea, cough, chest tightness and shortness of breath.    Cardiovascular: Negative.  Negative for chest pain, palpitations and leg swelling.   Gastrointestinal: Negative.  Negative for abdominal pain.   Skin: Negative.           Psychiatric/Behavioral: Negative.        Current Outpatient Medications   Medication Sig Dispense Refill    Varenicline Tartrate, Starter, (CHANTIX STARTING MONTH PAK) 0.5 MG X 11 & 1 MG X 42 Oral Tablet Therapy Pack Take 0.5 mg by mouth 2 (two) times a day for 5 days, THEN 1 mg 2 (two) times a day for 25 days. Take as directed. 1 each 0    varenicline (CHANTIX CONTINUING MONTH BROWN) 1 MG Oral Tab Take 1 tablet (1 mg total) by mouth 2 (two) times daily. 180 tablet 0    benzonatate 200 MG Oral Cap Take 1 capsule (200 mg total) by mouth 3 (three) times daily as needed for cough. (Patient not taking: Reported on 4/13/2023) 20 capsule 0    predniSONE 10 MG Oral Tab Take 3 tabs daily for 3 days then 2 tabs daily for 3 days then 1 tab daily for 3 days (Patient not taking: Reported on 4/13/2023) 18 tablet 0    Biotin 2500 MCG Oral Cap Take 1 capsule by mouth daily. (Patient not taking: Reported on 4/13/2023)      Multiple Vitamins-Minerals (BARIATRIC FUSION OR) Take 1 tablet by mouth daily.   (Patient not taking: Reported on 4/13/2023)       Allergies:  Allergies   Allergen Reactions    Radiology Contrast Iodinated Dyes HIVES and ITCHING       Objective:   Physical Exam  Constitutional:       Appearance: She is well-developed.   Cardiovascular:      Rate and Rhythm: Normal rate and regular rhythm.      Heart sounds: Normal heart sounds.   Pulmonary:      Effort:  Pulmonary effort is normal.      Breath sounds: Normal breath sounds.   Abdominal:      General: Bowel sounds are normal.      Palpations: Abdomen is soft.   Neurological:      Mental Status: She is alert.      Deep Tendon Reflexes: Reflexes are normal and symmetric.         Assessment & Plan:   1. Colon cancer screening    2. Annual physical exam    3. Tobacco dependence    Start chantix   Diet and exercise discussed    Orders Placed This Encounter   Procedures    Comp Metabolic Panel (14)    Lipid Panel    Assay, Thyroid Stim Hormone    CBC With Differential With Platelet       Meds This Visit:  Requested Prescriptions     Signed Prescriptions Disp Refills    Varenicline Tartrate, Starter, (CHANTIX STARTING MONTH BROWN) 0.5 MG X 11 & 1 MG X 42 Oral Tablet Therapy Pack 1 each 0     Sig: Take 0.5 mg by mouth 2 (two) times a day for 5 days, THEN 1 mg 2 (two) times a day for 25 days. Take as directed.    varenicline (CHANTIX CONTINUING MONTH BROWN) 1 MG Oral Tab 180 tablet 0     Sig: Take 1 tablet (1 mg total) by mouth 2 (two) times daily.       Imaging & Referrals:  OP REFERRAL TO [facility] TELEPHONE COLON SCREEN

## 2024-02-15 ENCOUNTER — OFFICE VISIT (OUTPATIENT)
Dept: PODIATRY CLINIC | Facility: CLINIC | Age: 49
End: 2024-02-15

## 2024-02-15 ENCOUNTER — EKG ENCOUNTER (OUTPATIENT)
Dept: LAB | Facility: HOSPITAL | Age: 49
End: 2024-02-15
Attending: INTERNAL MEDICINE
Payer: COMMERCIAL

## 2024-02-15 ENCOUNTER — OFFICE VISIT (OUTPATIENT)
Dept: SURGERY | Facility: CLINIC | Age: 49
End: 2024-02-15
Payer: COMMERCIAL

## 2024-02-15 VITALS
HEIGHT: 61 IN | BODY MASS INDEX: 42.2 KG/M2 | WEIGHT: 223.5 LBS | HEART RATE: 66 BPM | OXYGEN SATURATION: 100 % | DIASTOLIC BLOOD PRESSURE: 62 MMHG | SYSTOLIC BLOOD PRESSURE: 134 MMHG

## 2024-02-15 DIAGNOSIS — E55.9 VITAMIN D DEFICIENCY: ICD-10-CM

## 2024-02-15 DIAGNOSIS — E44.1 MILD PROTEIN-CALORIE MALNUTRITION (HCC): ICD-10-CM

## 2024-02-15 DIAGNOSIS — E66.01 MORBID OBESITY WITH BMI OF 40.0-44.9, ADULT (HCC): ICD-10-CM

## 2024-02-15 DIAGNOSIS — F43.9 STRESS: ICD-10-CM

## 2024-02-15 DIAGNOSIS — Z98.84 S/P GASTRIC BYPASS: ICD-10-CM

## 2024-02-15 DIAGNOSIS — M76.71 PERONEUS BREVIS TENDINITIS, RIGHT: Primary | ICD-10-CM

## 2024-02-15 DIAGNOSIS — E44.1 MILD PROTEIN-CALORIE MALNUTRITION (HCC): Primary | ICD-10-CM

## 2024-02-15 LAB
ATRIAL RATE: 64 BPM
P AXIS: 22 DEGREES
P-R INTERVAL: 158 MS
Q-T INTERVAL: 420 MS
QRS DURATION: 92 MS
QTC CALCULATION (BEZET): 433 MS
R AXIS: -5 DEGREES
T AXIS: 43 DEGREES
VENTRICULAR RATE: 64 BPM
VIT B12 SERPL-MCNC: 304 PG/ML (ref 211–911)
VIT D+METAB SERPL-MCNC: 16 NG/ML (ref 30–100)

## 2024-02-15 PROCEDURE — 93010 ELECTROCARDIOGRAM REPORT: CPT | Performed by: INTERNAL MEDICINE

## 2024-02-15 PROCEDURE — 3078F DIAST BP <80 MM HG: CPT | Performed by: INTERNAL MEDICINE

## 2024-02-15 PROCEDURE — 3008F BODY MASS INDEX DOCD: CPT | Performed by: INTERNAL MEDICINE

## 2024-02-15 PROCEDURE — 99203 OFFICE O/P NEW LOW 30 MIN: CPT | Performed by: STUDENT IN AN ORGANIZED HEALTH CARE EDUCATION/TRAINING PROGRAM

## 2024-02-15 PROCEDURE — 82607 VITAMIN B-12: CPT | Performed by: INTERNAL MEDICINE

## 2024-02-15 PROCEDURE — 82306 VITAMIN D 25 HYDROXY: CPT | Performed by: INTERNAL MEDICINE

## 2024-02-15 PROCEDURE — 3075F SYST BP GE 130 - 139MM HG: CPT | Performed by: INTERNAL MEDICINE

## 2024-02-15 PROCEDURE — 84425 ASSAY OF VITAMIN B-1: CPT | Performed by: INTERNAL MEDICINE

## 2024-02-15 PROCEDURE — 99215 OFFICE O/P EST HI 40 MIN: CPT | Performed by: INTERNAL MEDICINE

## 2024-02-15 PROCEDURE — 93005 ELECTROCARDIOGRAM TRACING: CPT

## 2024-02-15 RX ORDER — PHENTERMINE HYDROCHLORIDE 15 MG/1
15 CAPSULE ORAL EVERY MORNING
Qty: 30 CAPSULE | Refills: 5 | Status: SHIPPED | OUTPATIENT
Start: 2024-02-15

## 2024-02-15 RX ORDER — MELOXICAM 15 MG/1
15 TABLET ORAL
Qty: 30 TABLET | Refills: 1 | Status: SHIPPED | OUTPATIENT
Start: 2024-02-15

## 2024-02-15 NOTE — PROGRESS NOTES
[facility], [facility], [place]  [address]  [place] [address]  Dept: [phone]        Patient:  Enma Martinez  :      1975  MRN:      [id number]    Referring Provider: Vera Butterfield MD     Chief Complaint:     Chief Complaint   Patient presents with    Follow - Up    Weight Management     Weight management follow up        Date of Surgery:   2018 Dr Ny  Surgery Type:   Laparoscopic gastric bypass surgery and Hernia repair     Subjective     Satiety:  positive  Food Intake:  <1 cup(s)  Fluid Intake:  64 oz  Protein Intake:  adeq grams  Vitamin:  Yes   Bariatric advantage  Exercise: Yes  Comorbidities:  Back pain-Improvement?  yes, Joint pain-Improvement?  yes, Hypertension-Improvement?  yes, KRISTIAN-Improvement?  yes and Snoring-Improvement?  yes    Objective     Vitals: /62   Pulse 66   Ht 5' 1\" (1.549 m)   Wt 223 lb 8 oz (101.4 kg)   LMP 2023 (Exact Date)   SpO2 100%   BMI 42.23 kg/m²     Starting weight: 337   Current weight:   Interval weight loss: +16   Total weight loss:  -130    Last 3 Weights   02/15/24 0934 223 lb 8 oz (101.4 kg)   24 0858 215 lb 6.4 oz (97.7 kg)   23 1039 203 lb (92.1 kg)       Patient Medications:    Current Outpatient Medications:     varenicline (CHANTIX CONTINUING MONTH BROWN) 1 MG Oral Tab, Take 1 tablet (1 mg total) by mouth 2 (two) times daily., Disp: 180 tablet, Rfl: 0    benzonatate 200 MG Oral Cap, Take 1 capsule (200 mg total) by mouth 3 (three) times daily as needed for cough. (Patient not taking: Reported on 2023), Disp: 20 capsule, Rfl: 0    predniSONE 10 MG Oral Tab, Take 3 tabs daily for 3 days then 2 tabs daily for 3 days then 1 tab daily for 3 days (Patient not taking: Reported on 2023), Disp: 18 tablet, Rfl: 0    Biotin 2500 MCG Oral Cap, Take 1 capsule by mouth daily. (Patient not taking: Reported on 2023), Disp: , Rfl:     Multiple Vitamins-Minerals  (BARIATRIC FUSION OR), Take 1 tablet by mouth daily.   (Patient not taking: Reported on 2023), Disp: , Rfl:     Allergies:  Radiology contrast iodinated dyes     Social History:    Social History     Socioeconomic History    Marital status:    Tobacco Use    Smoking status: Former     Packs/day: 0.50     Years: 10.00     Additional pack years: 0.00     Total pack years: 5.00     Types: Cigarettes     Quit date: 2011     Years since quittin.0    Smokeless tobacco: Never   Vaping Use    Vaping Use: Never used   Substance and Sexual Activity    Alcohol use: No    Drug use: No     Surgical History:    Past Surgical History:   Procedure Laterality Date          CHOLECYSTECTOMY      GASTRIC BYPASS,OBESE<100CM WENDI-EN-Y  2018    Dr Ny, [facility]    HERNIA SURGERY      OTHER      left thumb surgery       Family History:    Family History   Problem Relation Age of Onset    Hypertension Mother     Diabetes Mother     Thyroid Disorder Mother     Heart Attack Father     Diabetes Father     Hypertension Father     Soft Tissue Cancer Maternal Grandfather     Cancer Paternal Grandmother     Cancer Paternal Grandfather     Breast Cancer Maternal Aunt 31    Breast Cancer Other 32        pat       Physical Exam:  Vital signs: Blood pressure 134/62, pulse 66, height 5' 1\" (1.549 m), weight 223 lb 8 oz (101.4 kg), last menstrual period 2023, SpO2 100%, not currently breastfeeding.  General appearance: alert, appears stated age and cooperative  Head: Normocephalic, without obvious abnormality, atraumatic  Eyes: conjunctivae/corneas clear. PERRL, EOM's intact. Fundi benign.  Back: symmetric, no curvature. ROM normal. No CVA tenderness.  Lungs: clear to auscultation bilaterally  Heart: S1, S2 normal, no murmur, click, rub or gallop, regular rate and rhythm  Abdomen:  soft, tender at umbilicus  Extremities: extremities normal, atraumatic, no cyanosis or edema  Pulses: 2+ and  symmetric  Skin:  irritation noted under skin folds  Neurologic: Grossly normal       ROS:    Constitutional: positive for fatigue  Respiratory: negative  Cardiovascular: negative  Gastrointestinal: positive for abdominal pain  Musculoskeletal:positive for arthralgias and back pain  Neurological: negative  Behavioral/Psych: negative  Endocrine: negative  All other systems were reviewed and are negative    Assessment       Encounter Diagnosis(ses):   Encounter Diagnoses   Name Primary?    Stress Yes    Mild protein-calorie malnutrition (HCC)     S/P gastric bypass     Morbid obesity with BMI of 40.0-44.9, adult (HCC)        Plan     S/P yoana en Y 12/17/2018    Feels well    Continue vitamins    Keep moving    Follow up with RD and surgeon as scheduled      Follow up in six months    Follow up with PCP        No orders of the defined types were placed in this encounter.          Binh Parsons MD

## 2024-02-15 NOTE — PROGRESS NOTES
[facility]  Progress Note      Enma Martinze is a 48 year old female.   Chief Complaint   Patient presents with    Foot Pain     Consult- R foot- onset- 2.5 mos ago- denies injury- rates pain 5-9/10 all the time at the lateral side of the foot             HPI:     Pt is a pleasant 48 year old who underwent a gastric has been working out.  She admits to right fifth metatarsal base pain for the past 2 and half months.  Admits to walking barefoot at home.  She admits to being more active recently.  Denies any injuries or trauma.      Allergies: Radiology contrast iodinated dyes    Current Outpatient Medications   Medication Sig Dispense Refill    Phentermine HCl 15 MG Oral Cap Take 1 capsule (15 mg total) by mouth every morning. 30 capsule 5    varenicline (CHANTIX CONTINUING MONTH BROWN) 1 MG Oral Tab Take 1 tablet (1 mg total) by mouth 2 (two) times daily. 180 tablet 0    Multiple Vitamins-Minerals (BARIATRIC FUSION OR) Take 1 tablet by mouth daily.   (Patient not taking: Reported on 2023)        Past Medical History:   Diagnosis Date    Back problem     Fatty liver     Morbid obesity with BMI of 60.0-69.9, adult ([facility])     S/P gastric bypass 2018    for weight loss    Visual impairment     EYEGLASSES      Past Surgical History:   Procedure Laterality Date          CHOLECYSTECTOMY      GASTRIC BYPASS,OBESE<100CM WENDI-EN-Y  2018    Dr Ny, [facility]    HERNIA SURGERY      OTHER      left thumb surgery      Family History   Problem Relation Age of Onset    Hypertension Mother     Diabetes Mother     Thyroid Disorder Mother     Heart Attack Father     Diabetes Father     Hypertension Father     Soft Tissue Cancer Maternal Grandfather     Cancer Paternal Grandmother     Cancer Paternal Grandfather     Breast Cancer Maternal Aunt 31    Breast Cancer Other 32        pat      Social History     Socioeconomic History    Marital status:    Tobacco Use    Smoking status:  Former     Packs/day: 0.50     Years: 10.00     Additional pack years: 0.00     Total pack years: 5.00     Types: Cigarettes     Quit date: 2011     Years since quittin.0    Smokeless tobacco: Never   Vaping Use    Vaping Use: Never used   Substance and Sexual Activity    Alcohol use: No    Drug use: No           REVIEW OF SYSTEMS:     Denies nause, fever, chills  No calf pain  Denies chest pain or SOB      EXAM:   LMP 2023 (Exact Date)   GENERAL: well developed, well nourished, in no apparent distress  EXTREMITIES:   1. Integument: Normal skin temperature and turgor  2. Vascular: Dorsalis pedis two out of four bilateral and posterior tibial pulses two out of   four bilateral, capillary refill normal.   3. Musculoskeletal: Pain with palpation to the right fifth metatarsal styloid process at the peroneus brevis insertion.   4. Neurological: Normal sharp dull sensation; reflexes normal.             ASSESSMENT AND PLAN:   Diagnoses and all orders for this visit:    Peroneus brevis tendinitis, right        Plan:     -Pt seen and examned. Findings discussed with patient.  -Discussed etiology of condition along with tx options.  -Discussed the importance of R.I.C.E (resting, icing, compression and elevation) during  Acute inflammatory stages.  -Rx for NSAIDs to be taken as directed/PRN for pain  -Discussed the importance of supportive shoegear and not walking barefoot.    RTC if symptoms worsen or fail to improve       The patient indicates understanding of these issues and agrees to the plan.        Symone Yost DPM

## 2024-02-16 DIAGNOSIS — E55.9 VITAMIN D DEFICIENCY: Primary | ICD-10-CM

## 2024-02-16 RX ORDER — ERGOCALCIFEROL 1.25 MG/1
50000 CAPSULE ORAL WEEKLY
Qty: 12 CAPSULE | Refills: 3 | Status: SHIPPED | OUTPATIENT
Start: 2024-02-16 | End: 2024-05-04

## 2024-02-23 LAB — VITAMIN B1 WHOLE BLD: 130.8 NMOL/L

## 2024-03-08 NOTE — PROCEDURES
Needles around the nail of the thumb. Few ear needles. No complications. Total of 7 red needles used. ,

## 2024-04-15 ENCOUNTER — TELEPHONE (OUTPATIENT)
Dept: SURGERY | Facility: CLINIC | Age: 49
End: 2024-04-15

## 2024-04-15 DIAGNOSIS — E66.01 MORBID OBESITY WITH BMI OF 40.0-44.9, ADULT (HCC): ICD-10-CM

## 2024-04-15 RX ORDER — PHENTERMINE HYDROCHLORIDE 15 MG/1
15 CAPSULE ORAL EVERY MORNING
Qty: 30 CAPSULE | Refills: 5 | Status: SHIPPED | OUTPATIENT
Start: 2024-04-15

## 2024-04-15 NOTE — TELEPHONE ENCOUNTER
Sunitha accidentally dropped her 90 day phentermine 15 mg in the toilet. Can you refill for only 30 days

## 2024-06-23 DIAGNOSIS — F17.200 TOBACCO DEPENDENCE: ICD-10-CM

## 2024-06-26 RX ORDER — VARENICLINE TARTRATE 1 MG/1
1 TABLET, FILM COATED ORAL 2 TIMES DAILY
Qty: 180 TABLET | Refills: 0 | Status: SHIPPED | OUTPATIENT
Start: 2024-06-26

## 2024-06-26 RX ORDER — VARENICLINE TARTRATE 0.5 (11)-1
KIT ORAL
Qty: 53 EACH | Refills: 0 | OUTPATIENT
Start: 2024-06-26

## 2024-06-26 NOTE — TELEPHONE ENCOUNTER
Please review; protocol failed/No Protocol    Last Office Visit: 01/04/2024    Requested Prescriptions   Pending Prescriptions Disp Refills    VARENICLINE 1 MG Oral Tab [Pharmacy Med Name: VARENICLINE (APO) 1MG TABLETS] 180 tablet 0     Sig: TAKE 1 TABLET(1 MG) BY MOUTH TWICE DAILY       There is no refill protocol information for this order      Refused Prescriptions Disp Refills    VARENICLINE TARTRATE, STARTER, 0.5 MG X 11 & 1 MG X 42 Oral Tablet Therapy Pack [Pharmacy Med Name: VARENICLINE STARTING MONTH BROWN 53S] 53 each 0     Sig: TAKE 0.5 MG BY MOUTH TWICE DAILY FOR 5 DAYS, THEN 1 MG BY MOUTH TWICE DAILY FOR 25 DAYS       There is no refill protocol information for this order        Future Appointments         Provider Department Appt Notes    In 1 month Binh Parsons MD [facility]           Recent Outpatient Visits              4 months ago Peroneus brevis tendinitis, right    [facility] Symone Yost DPM    Office Visit    4 months ago Mild protein-calorie malnutrition (HCC)    [facility] Binh Parsons MD    Office Visit    5 months ago [facility]    [facility] Visit    1 year ago Encounter for annual routine gynecological examination    [facility] Leighann Corona CNM    Office Visit    2 years ago [facility] respiratory infection, acute    [facility]    Tele[facility]icine

## 2024-08-06 ENCOUNTER — APPOINTMENT (OUTPATIENT)
Dept: GENERAL RADIOLOGY | Age: 49
End: 2024-08-06
Attending: Physician Assistant
Payer: COMMERCIAL

## 2024-08-06 ENCOUNTER — HOSPITAL ENCOUNTER (OUTPATIENT)
Age: 49
Discharge: HOME | End: 2024-08-06
Payer: COMMERCIAL

## 2024-08-06 VITALS
SYSTOLIC BLOOD PRESSURE: 141 MMHG | RESPIRATION RATE: 18 BRPM | TEMPERATURE: 97 F | HEART RATE: 70 BPM | OXYGEN SATURATION: 100 % | DIASTOLIC BLOOD PRESSURE: 91 MMHG
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
## 2024-08-06 DIAGNOSIS — S62.636A CLOSED DISPLACED FRACTURE OF DISTAL PHALANX OF RIGHT LITTLE FINGER, INITIAL ENCOUNTER: Primary | ICD-10-CM

## 2024-08-06 PROCEDURE — A4570 SPLINT: HCPCS | Performed by: PHYSICIAN ASSISTANT

## 2024-08-06 PROCEDURE — 99213 OFFICE O/P EST LOW 20 MIN: CPT | Performed by: PHYSICIAN ASSISTANT

## 2024-08-06 PROCEDURE — 73630 X-RAY EXAM OF FOOT: CPT | Performed by: PHYSICIAN ASSISTANT

## 2024-08-06 PROCEDURE — 73130 X-RAY EXAM OF HAND: CPT | Performed by: PHYSICIAN ASSISTANT

## 2024-08-06 NOTE — ED INITIAL ASSESSMENT (HPI)
Tripped down stairs 1 week ago, cannot remember how she fell, but has increased R foot pain and R pinky pain after fall. Decreased ROM in R hand noted.

## 2024-08-06 NOTE — ED PROVIDER NOTES
Chief Complaint   Patient presents with    Finger Injury    Foot Injury       History obtained from: patient   services not used    HPI:     Enma Martinez is a 49 year old female who presents with right pinky pain and right foot pain following mechanical fall 1 week ago.  Patient states she was carrying groceries inside when she tripped and fell.  Patient states she jammed her right pinky finger and also hurt the right side of her right foot.  Patient states swelling to her finger has improved but she continues to have pain especially with movement.  No other injury sustained or complaints at this time.  Denies head injury, LOC, neck pain, back pain, numbness, weakness, wound, change in skin color/temperature.    PMH  Past Medical History:    Back problem    Fatty liver    Morbid obesity with BMI of 60.0-69.9, adult (formerly [facility])    S/P gastric bypass    for weight loss    Visual impairment    EYEGLASSES       PFSH    PFSH asessment screens reviewed and agree.  Nurses notes reviewed I agree with documentation.    Family History   Problem Relation Age of Onset    Hypertension Mother     Diabetes Mother     Thyroid Disorder Mother     Heart Attack Father     Diabetes Father     Hypertension Father     Soft Tissue Cancer Maternal Grandfather     Cancer Paternal Grandmother     Cancer Paternal Grandfather     Breast Cancer Maternal Aunt 31    Breast Cancer Other 32        pat     Family history reviewed with patient/caregiver and is not pertinent to presenting problem.  Social History     Socioeconomic History    Marital status:      Spouse name: Not on file    Number of children: Not on file    Years of education: Not on file    Highest education level: Not on file   Occupational History    Not on file   Tobacco Use    Smoking status: Former     Current packs/day: 0.00     Average packs/day: 0.5 packs/day for 10.0 years (5.0 ttl pk-yrs)     Types: Cigarettes     Start date: 1/28/2001     Quit date: 1/28/2011      Years since quittin.5    Smokeless tobacco: Never   Vaping Use    Vaping status: Never Used   Substance and Sexual Activity    Alcohol use: No    Drug use: No    Sexual activity: Not on file   Other Topics Concern    Not on file   Social History Narrative    Not on file     Social Determinants of Health     Financial Resource Strain: Not on file   Food Insecurity: Not on file   Transportation Needs: Not on file   Physical Activity: Not on file   Stress: Not on file   Social Connections: Not on file   Housing Stability: Not on file         ROS:   Positive for stated complaint: Right pinky finger injury, right foot injury  All other systems reviewed and negative except as noted above.  Constitutional and Vital Signs Reviewed.    Physical Exam:     Findings:    BP (!) 141/91   Pulse 70   Temp 97 °F (36.1 °C) (Temporal)   Resp 18   SpO2 100%   GENERAL: well developed, no acute distress, non-toxic appearing   SKIN: good skin turgor, no obvious rashes  HEAD: normocephalic, atraumatic  EYES: sclera non-icteric bilaterally, conjunctiva clear bilaterally  OROPHARYNX: MMM, maintaining airway and secretions  NECK: no nuchal rigidity, no trismus, no edema, phonation normal    CARDIO: Regular rate, radial pulse 2+ bilaterally, DP pulse 2+ bilaterally, cap refill less than 2 seconds  LUNGS: no increased WOB  EXTREMITIES: Tenderness and minimal swelling to DIP joint of right fifth finger, no obvious deformity, F ROM, tenderness to lateral right foot, no obvious swelling or deformity, F ROM, BUE and BLE compartments soft and CMS intact, skin intact without overlying changes  NEURO: no focal deficits  PSYCH: alert and oriented x3, answering questions appropriately, mood appropriate    MDM/Assessment/Plan:   Orders for this encounter:    Orders Placed This Encounter    XR HAND (MIN 3 VIEWS), RIGHT (CPT=73130)     Pain in finger Tripped down stairs 1 week ago, cannot remember how she fell, but has   increased R foot pain  and R pinky pain after fall. Decreased ROM in R hand   noted.        Order Specific Question:   What is the Relevant Clinical Indication / Reason for Exam?     Answer:   Pain in finger    XR FOOT, COMPLETE (MIN 3 VIEWS), RIGHT (CPT=73630)     Pain in finger Tripped down stairs 1 week ago, cannot remember how she fell, but has   increased R foot pain and R pinky pain after fall. Decreased ROM in R hand   noted.        Order Specific Question:   What is the Relevant Clinical Indication / Reason for Exam?     Answer:   Pain in finger    Finger splint       Labs performed this visit:  No results found for this or any previous visit (from the past 10 hour(s)).    Imaging performed this visit:  XR FOOT, COMPLETE (MIN 3 VIEWS), RIGHT (CPT=73630)   Final Result   PROCEDURE: XR FOOT, COMPLETE (MIN 3 VIEWS), RIGHT (CPT=73630)       COMPARISON: None.       INDICATIONS: Right lateral foot pain post fall x1 week.       TECHNIQUE: 3 views were obtained.         FINDINGS:    BONES: No acute fracture or dislocation.  No significant degenerative    change.  Mild calcaneal enthesophytes.   SOFT TISSUES: Negative. No visible soft tissue swelling.    EFFUSION: None visible.    OTHER: Negative.                    =====   CONCLUSION: No acute fracture or dislocation.               Dictated by (CST): Kareem Goff MD on 8/06/2024 at 9:11 AM        Finalized by (CST): Kareem Goff MD on 8/06/2024 at 9:12 AM               XR HAND (MIN 3 VIEWS), RIGHT (CPT=73130)   Final Result   PROCEDURE: XR HAND (MIN 3 VIEWS), RIGHT (CPT=73130)       COMPARISON: None.       INDICATIONS: Right hand 4th-5th digits pain post fall x1 week.       TECHNIQUE: 3 views were obtained.             FINDINGS: Combined with conclusion.                   =====   CONCLUSION:        Dorsal avulsion fracture at the base of the 5th distal phalanx with mild    displacement.  The remaining osseous structures are intact.       No acute dislocation.       Soft tissue  swelling of the 5th digit                     Dictated by (CST): Calixto Green MD on 8/06/2024 at 9:13 AM        Finalized by (CST): Calixto Green MD on 8/06/2024 at 9:14 AM                   Medical Decision Making  DDx includes fracture versus sprain versus contusion versus other.  Patient is overall very well-appearing s/p mechanical fall 1 week ago.  No signs of neurovascular compromise or compartment syndrome.  X-ray of right hand reviewed, avulsion fracture of fifth distal phalanx, no acute dislocation, soft tissue swelling noted.  X-ray right foot reviewed, no evidence of acute fracture or dislocation.  Discussed results with patient.  Aluminum finger splint applied to right fifth finger.  Discussed supportive care including rest, ice, elevation, and OTC Tylenol/Motrin as needed for pain.  Instructed patient to go directly to nearest ER with any worsening or concerning symptoms.  Follow-up with hand specialist.    Amount and/or Complexity of Data Reviewed  Radiology: ordered and independent interpretation performed.    Risk  OTC drugs.          Diagnosis:    ICD-10-CM    1. Closed displaced fracture of distal phalanx of right little finger, initial encounter  S62.636A       2. Fall, initial encounter  W19.XXXA           All results reviewed and discussed with patient/patient's family. Patient/patient's family verbalize excellent understanding of instructions and feels comfortable with plan. All of patient's/patient's family's questions were addressed.   See AVS for detailed discharge instructions for your condition today.    Follow Up with:  Franko Nava MD  [facility] [address]  [phone]      Hand specialist      Note: This document was dictated using Dragon medical dictation software.  Proofreading was performed to the best of my ability, but errors may be present.    Cherelle Escobar PA-C

## 2024-08-06 NOTE — DISCHARGE INSTRUCTIONS
Rest, ice, and elevate finger   Alternate Motrin/Tylenol as needed for pain   Drink plenty of fluids   Get plenty of rest   Avoid excessive twisting, bending, or lifting   Follow up with hand specialist

## 2024-10-12 ENCOUNTER — APPOINTMENT (OUTPATIENT)
Dept: MRI IMAGING | Age: 49
End: 2024-10-12

## 2024-10-12 ENCOUNTER — APPOINTMENT (OUTPATIENT)
Dept: GENERAL RADIOLOGY | Age: 49
End: 2024-10-12

## 2024-10-12 ENCOUNTER — APPOINTMENT (OUTPATIENT)
Dept: CT IMAGING | Age: 49
End: 2024-10-12

## 2024-10-12 ENCOUNTER — HOSPITAL ENCOUNTER (OUTPATIENT)
Age: 49
Setting detail: OBSERVATION
End: 2024-10-12
Attending: EMERGENCY MEDICINE | Admitting: INTERNAL MEDICINE

## 2024-10-12 VITALS
SYSTOLIC BLOOD PRESSURE: 108 MMHG | TEMPERATURE: 97.7 F | OXYGEN SATURATION: 99 % | RESPIRATION RATE: 18 BRPM | DIASTOLIC BLOOD PRESSURE: 61 MMHG | HEART RATE: 55 BPM

## 2024-10-12 DIAGNOSIS — S62.666A CLOSED NONDISPLACED FRACTURE OF DISTAL PHALANX OF RIGHT LITTLE FINGER, INITIAL ENCOUNTER: ICD-10-CM
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
## 2024-10-12 DIAGNOSIS — S19.80XA HYPEREXTENSION INJURY OF NECK, INITIAL ENCOUNTER: ICD-10-CM

## 2024-10-12 DIAGNOSIS — S09.90XA CLOSED HEAD INJURY WITHOUT LOSS OF CONSCIOUSNESS, INITIAL ENCOUNTER: ICD-10-CM

## 2024-10-12 LAB
ALBUMIN SERPL-MCNC: 3.5 G/DL (ref 3.4–5)
ALBUMIN/GLOB SERPL: 0.9 {RATIO} (ref 1–2.4)
ALP SERPL-CCNC: 97 UNITS/L (ref 45–117)
ALT SERPL-CCNC: 22 UNITS/L
ANION GAP SERPL CALC-SCNC: 9 MMOL/L (ref 7–19)
AST SERPL-CCNC: 27 UNITS/L
ATRIAL RATE (BPM): 65
BASOPHILS # BLD: 0 K/MCL (ref 0–0.3)
BASOPHILS NFR BLD: 1 %
BILIRUB SERPL-MCNC: 0.5 MG/DL (ref 0.2–1)
BUN SERPL-MCNC: 11 MG/DL (ref 6–20)
BUN/CREAT SERPL: 15 (ref 7–25)
CALCIUM SERPL-MCNC: 9.4 MG/DL (ref 8.4–10.2)
CHLORIDE SERPL-SCNC: 110 MMOL/L (ref 97–110)
CO2 SERPL-SCNC: 25 MMOL/L (ref 21–32)
CREAT SERPL-MCNC: 0.72 MG/DL (ref 0.51–0.95)
DEPRECATED RDW RBC: 39.6 FL (ref 39–50)
EGFRCR SERPLBLD CKD-EPI 2021: >90 ML/MIN/{1.73_M2}
EOSINOPHIL # BLD: 0.2 K/MCL (ref 0–0.5)
EOSINOPHIL NFR BLD: 3 %
ERYTHROCYTE [DISTWIDTH] IN BLOOD: 12.5 % (ref 11–15)
FASTING DURATION TIME PATIENT: ABNORMAL H
GLOBULIN SER-MCNC: 3.9 G/DL (ref 2–4)
GLUCOSE SERPL-MCNC: 97 MG/DL (ref 70–99)
HCG SERPL QL: NEGATIVE
HCT VFR BLD CALC: 42.3 % (ref 36–46.5)
HGB BLD-MCNC: 14.6 G/DL (ref 12–15.5)
IMM GRANULOCYTES # BLD AUTO: 0.1 K/MCL (ref 0–0.2)
IMM GRANULOCYTES # BLD: 1 %
LYMPHOCYTES # BLD: 2.1 K/MCL (ref 1–4.8)
LYMPHOCYTES NFR BLD: 27 %
MCH RBC QN AUTO: 30 PG (ref 26–34)
MCHC RBC AUTO-ENTMCNC: 34.5 G/DL (ref 32–36.5)
MCV RBC AUTO: 87 FL (ref 78–100)
MONOCYTES # BLD: 0.4 K/MCL (ref 0.3–0.9)
MONOCYTES NFR BLD: 6 %
NEUTROPHILS # BLD: 4.8 K/MCL (ref 1.8–7.7)
NEUTROPHILS NFR BLD: 62 %
NRBC BLD MANUAL-RTO: 0 /100 WBC
P AXIS (DEGREES): 15
PLATELET # BLD AUTO: 201 K/MCL (ref 140–450)
POTASSIUM SERPL-SCNC: 3.4 MMOL/L (ref 3.4–5.1)
PR-INTERVAL (MSEC): 168
PROT SERPL-MCNC: 7.4 G/DL (ref 6.4–8.2)
QRS-INTERVAL (MSEC): 94
QT-INTERVAL (MSEC): 434
QTC: 451
R AXIS (DEGREES): -3
RAINBOW EXTRA TUBES HOLD SPECIMEN: NORMAL
RBC # BLD: 4.86 MIL/MCL (ref 4–5.2)
REPORT TEXT: NORMAL
SODIUM SERPL-SCNC: 141 MMOL/L (ref 135–145)
T AXIS (DEGREES): 35
TROPONIN I SERPL DL<=0.01 NG/ML-MCNC: <4 NG/L
VENTRICULAR RATE EKG/MIN (BPM): 65
WBC # BLD: 7.7 K/MCL (ref 4.2–11)

## 2024-10-12 PROCEDURE — 99285 EMERGENCY DEPT VISIT HI MDM: CPT

## 2024-10-12 PROCEDURE — 85025 COMPLETE CBC W/AUTO DIFF WBC: CPT

## 2024-10-12 PROCEDURE — 93005 ELECTROCARDIOGRAM TRACING: CPT | Performed by: EMERGENCY MEDICINE

## 2024-10-12 PROCEDURE — 73030 X-RAY EXAM OF SHOULDER: CPT

## 2024-10-12 PROCEDURE — 96376 TX/PRO/DX INJ SAME DRUG ADON: CPT

## 2024-10-12 PROCEDURE — 72125 CT NECK SPINE W/O DYE: CPT

## 2024-10-12 PROCEDURE — 10005281 CT THORACIC AND LUMBAR SPINE 2D REFORMATTED

## 2024-10-12 PROCEDURE — G0378 HOSPITAL OBSERVATION PER HR: HCPCS

## 2024-10-12 PROCEDURE — 84484 ASSAY OF TROPONIN QUANT: CPT

## 2024-10-12 PROCEDURE — 74176 CT ABD & PELVIS W/O CONTRAST: CPT

## 2024-10-12 PROCEDURE — 99285 EMERGENCY DEPT VISIT HI MDM: CPT | Performed by: EMERGENCY MEDICINE

## 2024-10-12 PROCEDURE — 96375 TX/PRO/DX INJ NEW DRUG ADDON: CPT

## 2024-10-12 PROCEDURE — 70450 CT HEAD/BRAIN W/O DYE: CPT

## 2024-10-12 PROCEDURE — 73120 X-RAY EXAM OF HAND: CPT

## 2024-10-12 PROCEDURE — 84703 CHORIONIC GONADOTROPIN ASSAY: CPT

## 2024-10-12 PROCEDURE — 10002800 HB RX 250 W HCPCS

## 2024-10-12 PROCEDURE — 72141 MRI NECK SPINE W/O DYE: CPT

## 2024-10-12 PROCEDURE — 29130 APPL FINGER SPLINT STATIC: CPT

## 2024-10-12 PROCEDURE — 73552 X-RAY EXAM OF FEMUR 2/>: CPT

## 2024-10-12 PROCEDURE — 71045 X-RAY EXAM CHEST 1 VIEW: CPT

## 2024-10-12 PROCEDURE — 96374 THER/PROPH/DIAG INJ IV PUSH: CPT

## 2024-10-12 PROCEDURE — 80053 COMPREHEN METABOLIC PANEL: CPT

## 2024-10-12 PROCEDURE — 10004651 HB RX, NO CHARGE ITEM

## 2024-10-12 PROCEDURE — 73562 X-RAY EXAM OF KNEE 3: CPT

## 2024-10-12 RX ORDER — KETOROLAC TROMETHAMINE 15 MG/ML
15 INJECTION, SOLUTION INTRAMUSCULAR; INTRAVENOUS EVERY 6 HOURS SCHEDULED
Status: DISCONTINUED | OUTPATIENT
Start: 2024-10-12 | End: 2024-10-13

## 2024-10-12 RX ORDER — ONDANSETRON 2 MG/ML
4 INJECTION INTRAMUSCULAR; INTRAVENOUS ONCE
Status: COMPLETED | OUTPATIENT
Start: 2024-10-12 | End: 2024-10-12

## 2024-10-12 RX ORDER — 0.9 % SODIUM CHLORIDE 0.9 %
10 VIAL (ML) INJECTION PRN
Status: DISCONTINUED | OUTPATIENT
Start: 2024-10-12 | End: 2024-10-13 | Stop reason: HOSPADM

## 2024-10-12 RX ORDER — ACETAMINOPHEN 500 MG
1000 TABLET ORAL EVERY 12 HOURS
Status: DISCONTINUED | OUTPATIENT
Start: 2024-10-12 | End: 2024-10-13 | Stop reason: HOSPADM

## 2024-10-12 RX ORDER — 0.9 % SODIUM CHLORIDE 0.9 %
2 VIAL (ML) INJECTION EVERY 12 HOURS SCHEDULED
Status: DISCONTINUED | OUTPATIENT
Start: 2024-10-12 | End: 2024-10-13 | Stop reason: HOSPADM

## 2024-10-12 RX ADMIN — MORPHINE SULFATE 4 MG: 4 INJECTION INTRAVENOUS at 16:38

## 2024-10-12 RX ADMIN — MORPHINE SULFATE 2 MG: 2 INJECTION, SOLUTION INTRAMUSCULAR; INTRAVENOUS at 21:13

## 2024-10-12 RX ADMIN — ACETAMINOPHEN 1000 MG: 500 TABLET ORAL at 23:18

## 2024-10-12 RX ADMIN — KETOROLAC TROMETHAMINE 15 MG: 15 INJECTION, SOLUTION INTRAMUSCULAR; INTRAVENOUS at 23:18

## 2024-10-12 RX ADMIN — FENTANYL CITRATE 25 MCG: 50 INJECTION INTRAMUSCULAR; INTRAVENOUS at 14:23

## 2024-10-12 RX ADMIN — ONDANSETRON 4 MG: 2 INJECTION INTRAMUSCULAR; INTRAVENOUS at 14:15

## 2024-10-12 RX ADMIN — SODIUM CHLORIDE, PRESERVATIVE FREE 2 ML: 5 INJECTION INTRAVENOUS at 21:13

## 2024-10-12 SDOH — SOCIAL STABILITY: SOCIAL INSECURITY: HOW OFTEN DOES ANYONE, INCLUDING FAMILY AND FRIENDS, PHYSICALLY HURT YOU?: NEVER

## 2024-10-12 SDOH — ECONOMIC STABILITY: INCOME INSECURITY: IN THE PAST 12 MONTHS, HAS THE ELECTRIC, GAS, OIL, OR WATER COMPANY THREATENED TO SHUT OFF SERVICE IN YOUR HOME?: NO

## 2024-10-12 SDOH — ECONOMIC STABILITY: GENERAL

## 2024-10-12 SDOH — ECONOMIC STABILITY: TRANSPORTATION INSECURITY
IN THE PAST 12 MONTHS, HAS LACK OF RELIABLE TRANSPORTATION KEPT YOU FROM MEDICAL APPOINTMENTS, MEETINGS, WORK OR FROM GETTING THINGS NEEDED FOR DAILY LIVING?: NO

## 2024-10-12 SDOH — SOCIAL STABILITY: SOCIAL NETWORK
HOW OFTEN DO YOU SEE OR TALK TO PEOPLE THAT YOU CARE ABOUT AND FEEL CLOSE TO? (FOR EXAMPLE: TALKING TO FRIENDS ON THE PHONE, VISITING FRIENDS OR FAMILY, GOING TO CHURCH OR CLUB MEETINGS): 3 TO 5 TIMES A WEEK

## 2024-10-12 SDOH — HEALTH STABILITY: GENERAL
BECAUSE OF A PHYSICAL, MENTAL, OR EMOTIONAL CONDITION, DO YOU HAVE SERIOUS DIFFICULTY CONCENTRATING, REMEMBERING OR MAKING DECISIONS?: NO

## 2024-10-12 SDOH — SOCIAL STABILITY: SOCIAL INSECURITY: HOW OFTEN DOES ANYONE, INCLUDING FAMILY AND FRIENDS, THREATEN YOU WITH HARM?: NEVER

## 2024-10-12 SDOH — SOCIAL STABILITY: SOCIAL INSECURITY: HOW OFTEN DOES ANYONE, INCLUDING FAMILY AND FRIENDS, INSULT OR TALK DOWN TO YOU?: NEVER

## 2024-10-12 SDOH — HEALTH STABILITY: PHYSICAL HEALTH: DO YOU HAVE DIFFICULTY DRESSING OR BATHING?: NO

## 2024-10-12 SDOH — ECONOMIC STABILITY: HOUSING INSECURITY: WHAT IS YOUR LIVING SITUATION TODAY?: SPOUSE;CHILDREN

## 2024-10-12 SDOH — HEALTH STABILITY: GENERAL: BECAUSE OF A PHYSICAL, MENTAL, OR EMOTIONAL CONDITION, DO YOU HAVE DIFFICULTY DOING ERRANDS ALONE?: NO

## 2024-10-12 SDOH — ECONOMIC STABILITY: GENERAL: WOULD YOU LIKE HELP WITH ANY OF THE FOLLOWING NEEDS?: I DON'T WANT HELP WITH ANY OF THESE

## 2024-10-12 SDOH — ECONOMIC STABILITY: FOOD INSECURITY: WITHIN THE PAST 12 MONTHS, THE FOOD YOU BOUGHT JUST DIDN'T LAST AND YOU DIDN'T HAVE MONEY TO GET MORE.: NEVER TRUE

## 2024-10-12 SDOH — ECONOMIC STABILITY: HOUSING INSECURITY: DO YOU HAVE PROBLEMS WITH ANY OF THE FOLLOWING?: NONE OF THE ABOVE

## 2024-10-12 SDOH — SOCIAL STABILITY: SOCIAL NETWORK: SUPPORT SYSTEMS: FAMILY MEMBERS

## 2024-10-12 SDOH — HEALTH STABILITY: PHYSICAL HEALTH: DO YOU HAVE SERIOUS DIFFICULTY WALKING OR CLIMBING STAIRS?: NO

## 2024-10-12 SDOH — ECONOMIC STABILITY: HOUSING INSECURITY: WHAT IS YOUR LIVING SITUATION TODAY?: HOUSE

## 2024-10-12 SDOH — SOCIAL STABILITY: SOCIAL INSECURITY: HOW OFTEN DOES ANYONE, INCLUDING FAMILY AND FRIENDS, SCREAM OR CURSE AT YOU?: NEVER

## 2024-10-12 SDOH — ECONOMIC STABILITY: HOUSING INSECURITY: WHAT IS YOUR LIVING SITUATION TODAY?: I HAVE A STEADY PLACE TO LIVE

## 2024-10-12 ASSESSMENT — MOVEMENT AND STRENGTH ASSESSMENTS
LLE: OTHER (COMMENT)
RLE: EQUAL STRENGTH/TONE/MOVEMENT
LLE: OTHER (COMMENT)
RLE: EQUAL STRENGTH/TONE/MOVEMENT
RUE: EQUAL STRENGTH/TONE/MOVEMENT
LUE: EQUAL STRENGTH/TONE/MOVEMENT
LUE: EQUAL STRENGTH/TONE/MOVEMENT
LLE: OTHER (COMMENT)
RUE: EQUAL STRENGTH/TONE/MOVEMENT
RLE: EQUAL STRENGTH/TONE/MOVEMENT
RLE: EQUAL STRENGTH/TONE/MOVEMENT
LUE: EQUAL STRENGTH/TONE/MOVEMENT
LUE: EQUAL STRENGTH/TONE/MOVEMENT
FACE_JAW: FACE SYMMETRICAL;TONGUE MIDLINE
RUE: EQUAL STRENGTH/TONE/MOVEMENT
FACE_JAW: FACE SYMMETRICAL;TONGUE MIDLINE
LUE: EQUAL STRENGTH/TONE/MOVEMENT
LLE: OTHER (COMMENT)
LUE: EQUAL STRENGTH/TONE/MOVEMENT
LLE: OTHER (COMMENT)
RUE: EQUAL STRENGTH/TONE/MOVEMENT
FACE_JAW: FACE SYMMETRICAL;TONGUE MIDLINE
LLE: OTHER (COMMENT)
FACE_JAW: FACE SYMMETRICAL;TONGUE MIDLINE
RLE: EQUAL STRENGTH/TONE/MOVEMENT
RLE: EQUAL STRENGTH/TONE/MOVEMENT
FACE_JAW: FACE SYMMETRICAL;TONGUE MIDLINE
FACE_JAW: FACE SYMMETRICAL;TONGUE MIDLINE

## 2024-10-12 ASSESSMENT — PAIN SCALES - PAIN ASSESSMENT IN ADVANCED DEMENTIA (PAINAD)
BREATHING: NORMAL
BREATHING: NORMAL

## 2024-10-12 ASSESSMENT — COLUMBIA-SUICIDE SEVERITY RATING SCALE - C-SSRS
2. HAVE YOU ACTUALLY HAD ANY THOUGHTS OF KILLING YOURSELF?: NO
6. HAVE YOU EVER DONE ANYTHING, STARTED TO DO ANYTHING, OR PREPARED TO DO ANYTHING TO END YOUR LIFE?: NO
IS THE PATIENT ABLE TO COMPLETE C-SSRS: YES
1. WITHIN THE PAST MONTH, HAVE YOU WISHED YOU WERE DEAD OR WISHED YOU COULD GO TO SLEEP AND NOT WAKE UP?: NO

## 2024-10-12 ASSESSMENT — ACTIVITIES OF DAILY LIVING (ADL)
ADL_BEFORE_ADMISSION: INDEPENDENT
RECENT_DECLINE_ADL: NO
ADL_SHORT_OF_BREATH: NO
ADL_SCORE: 12

## 2024-10-12 ASSESSMENT — ENCOUNTER SYMPTOMS
BACK PAIN: 0
VOMITING: 0
FEVER: 0
NUMBNESS: 1
ABDOMINAL PAIN: 1
CHILLS: 0
SHORTNESS OF BREATH: 0
WEAKNESS: 1

## 2024-10-12 ASSESSMENT — PAIN SCALES - GENERAL
PAINLEVEL_OUTOF10: 9
PAINLEVEL_OUTOF10: 6
PAINLEVEL_OUTOF10: 8
PAINLEVEL_OUTOF10: 4
PAINLEVEL_OUTOF10: 8
PAINLEVEL_OUTOF10: 5
PAINLEVEL_OUTOF10: 10
PAINLEVEL_OUTOF10: 9

## 2024-10-12 ASSESSMENT — LIFESTYLE VARIABLES
HOW OFTEN DO YOU HAVE A DRINK CONTAINING ALCOHOL: MONTHLY OR LESS
HOW OFTEN DO YOU HAVE 6 OR MORE DRINKS ON ONE OCCASION: NEVER
ALCOHOL_USE_STATUS: NO OR LOW RISK WITH VALIDATED TOOL
HOW MANY STANDARD DRINKS CONTAINING ALCOHOL DO YOU HAVE ON A TYPICAL DAY: 0,1 OR 2
AUDIT-C TOTAL SCORE: 1

## 2024-10-12 ASSESSMENT — PATIENT HEALTH QUESTIONNAIRE - PHQ9
1. LITTLE INTEREST OR PLEASURE IN DOING THINGS: NOT AT ALL
IS PATIENT ABLE TO COMPLETE PHQ2 OR PHQ9: YES
SUM OF ALL RESPONSES TO PHQ9 QUESTIONS 1 AND 2: 0
2. FEELING DOWN, DEPRESSED OR HOPELESS: NOT AT ALL
CLINICAL INTERPRETATION OF PHQ2 SCORE: NO FURTHER SCREENING NEEDED
SUM OF ALL RESPONSES TO PHQ9 QUESTIONS 1 AND 2: 0

## 2024-10-12 ASSESSMENT — PAIN SCALES - WONG BAKER: WONGBAKER_NUMERICALRESPONSE: 0

## 2024-10-13 ENCOUNTER — APPOINTMENT (OUTPATIENT)
Dept: MRI IMAGING | Age: 49
End: 2024-10-13

## 2024-10-13 VITALS
RESPIRATION RATE: 15 BRPM | HEART RATE: 54 BPM | TEMPERATURE: 98.1 F | WEIGHT: 241.18 LBS | BODY MASS INDEX: 44.38 KG/M2 | OXYGEN SATURATION: 98 % | HEIGHT: 62 IN | DIASTOLIC BLOOD PRESSURE: 74 MMHG | SYSTOLIC BLOOD PRESSURE: 122 MMHG

## 2024-10-13 PROBLEM — S13.4XXA INJURY TO LIGAMENT OF CERVICAL SPINE: Status: ACTIVE | Noted: 2024-10-13

## 2024-10-13 PROBLEM — E66.813 CLASS 3 SEVERE OBESITY IN ADULT (CMD): Status: ACTIVE | Noted: 2024-10-13

## 2024-10-13 PROBLEM — E66.01 CLASS 3 SEVERE OBESITY IN ADULT (CMD): Status: ACTIVE | Noted: 2024-10-13

## 2024-10-13 PROBLEM — M79.18 MYOFASCIAL PAIN ON LEFT SIDE: Status: ACTIVE | Noted: 2024-10-13

## 2024-10-13 LAB
ATRIAL RATE (BPM): 65
P AXIS (DEGREES): 15
PR-INTERVAL (MSEC): 168
QRS-INTERVAL (MSEC): 94
QT-INTERVAL (MSEC): 434
QTC: 451
R AXIS (DEGREES): -3
REPORT TEXT: NORMAL
T AXIS (DEGREES): 35
VENTRICULAR RATE EKG/MIN (BPM): 65

## 2024-10-13 PROCEDURE — 10004651 HB RX, NO CHARGE ITEM

## 2024-10-13 PROCEDURE — 10002800 HB RX 250 W HCPCS

## 2024-10-13 PROCEDURE — G0378 HOSPITAL OBSERVATION PER HR: HCPCS

## 2024-10-13 PROCEDURE — 96376 TX/PRO/DX INJ SAME DRUG ADON: CPT

## 2024-10-13 PROCEDURE — 72148 MRI LUMBAR SPINE W/O DYE: CPT

## 2024-10-13 RX ORDER — IBUPROFEN 400 MG/1
400 TABLET, FILM COATED ORAL EVERY 8 HOURS
Qty: 21 TABLET | Refills: 0 | Status: SHIPPED | OUTPATIENT
Start: 2024-10-13 | End: 2024-10-20

## 2024-10-13 RX ORDER — IBUPROFEN 400 MG/1
400 TABLET, FILM COATED ORAL EVERY 8 HOURS
Status: DISCONTINUED | OUTPATIENT
Start: 2024-10-13 | End: 2024-10-13 | Stop reason: HOSPADM

## 2024-10-13 RX ADMIN — SODIUM CHLORIDE, PRESERVATIVE FREE 2 ML: 5 INJECTION INTRAVENOUS at 08:52

## 2024-10-13 RX ADMIN — MORPHINE SULFATE 2 MG: 2 INJECTION, SOLUTION INTRAMUSCULAR; INTRAVENOUS at 06:07

## 2024-10-13 RX ADMIN — ACETAMINOPHEN 1000 MG: 500 TABLET ORAL at 12:20

## 2024-10-13 ASSESSMENT — PAIN SCALES - GENERAL
PAINLEVEL_OUTOF10: 5
PAINLEVEL_OUTOF10: 4
PAINLEVEL_OUTOF10: 8
PAINLEVEL_OUTOF10: 4
PAINLEVEL_OUTOF10: 0
PAINLEVEL_OUTOF10: 5

## 2024-10-15 ENCOUNTER — TELEPHONE (OUTPATIENT)
Dept: CARE COORDINATION | Age: 49
End: 2024-10-15

## 2024-10-16 ENCOUNTER — TELEPHONE (OUTPATIENT)
Dept: ORTHOPEDICS CLINIC | Facility: CLINIC | Age: 49
End: 2024-10-16

## 2024-10-16 ENCOUNTER — OFFICE VISIT (OUTPATIENT)
Dept: FAMILY MEDICINE CLINIC | Facility: CLINIC | Age: 49
End: 2024-10-16

## 2024-10-16 ENCOUNTER — CASE MANAGEMENT (OUTPATIENT)
Dept: CARE COORDINATION | Age: 49
End: 2024-10-16

## 2024-10-16 VITALS
HEART RATE: 68 BPM | DIASTOLIC BLOOD PRESSURE: 82 MMHG | BODY MASS INDEX: 42 KG/M2 | SYSTOLIC BLOOD PRESSURE: 146 MMHG | OXYGEN SATURATION: 98 % | HEIGHT: 61 IN

## 2024-10-16 DIAGNOSIS — S62.620A CLOSED DISPLACED FRACTURE OF MIDDLE PHALANX OF RIGHT INDEX FINGER, INITIAL ENCOUNTER: ICD-10-CM

## 2024-10-16 DIAGNOSIS — R20.2 NUMBNESS AND TINGLING OF LEFT LOWER EXTREMITY: ICD-10-CM

## 2024-10-16 DIAGNOSIS — H57.11 OCULAR PAIN, RIGHT EYE: ICD-10-CM
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
## 2024-10-16 DIAGNOSIS — R20.0 NUMBNESS AND TINGLING OF LEFT LOWER EXTREMITY: ICD-10-CM

## 2024-10-16 DIAGNOSIS — S13.4XXA INJURY TO LIGAMENT OF CERVICAL SPINE, INITIAL ENCOUNTER: ICD-10-CM

## 2024-10-16 RX ORDER — HYDROCODONE BITARTRATE AND ACETAMINOPHEN 10; 325 MG/1; MG/1
1 TABLET ORAL EVERY 6 HOURS PRN
Qty: 30 TABLET | Refills: 0 | Status: SHIPPED | OUTPATIENT
Start: 2024-10-16

## 2024-10-16 RX ORDER — CYCLOBENZAPRINE HCL 10 MG
10 TABLET ORAL NIGHTLY
Qty: 15 TABLET | Refills: 0 | Status: SHIPPED | OUTPATIENT
Start: 2024-10-16

## 2024-10-16 RX ORDER — IBUPROFEN 400 MG/1
400 TABLET, FILM COATED ORAL EVERY 8 HOURS
COMMUNITY
Start: 2024-10-13 | End: 2024-10-18

## 2024-10-16 RX ORDER — ERGOCALCIFEROL 1.25 MG/1
50000 CAPSULE, LIQUID FILLED ORAL WEEKLY
COMMUNITY
Start: 2024-09-22
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
## 2024-10-16 NOTE — TELEPHONE ENCOUNTER
Per patient has finger fracture, was advised to see ortho soon. No openings available this week. Please call thank  you.
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
## 2024-10-18 ENCOUNTER — OFFICE VISIT (OUTPATIENT)
Dept: NEUROLOGY | Facility: CLINIC | Age: 49
End: 2024-10-18
Payer: COMMERCIAL

## 2024-10-18 VITALS
HEART RATE: 68 BPM | DIASTOLIC BLOOD PRESSURE: 82 MMHG | BODY MASS INDEX: 45 KG/M2 | WEIGHT: 235.63 LBS | SYSTOLIC BLOOD PRESSURE: 138 MMHG | RESPIRATION RATE: 16 BRPM

## 2024-10-18 DIAGNOSIS — M54.9 BACK PAIN DUE TO INJURY: ICD-10-CM

## 2024-10-18 DIAGNOSIS — F07.81 POST CONCUSSION SYNDROME: ICD-10-CM
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
## 2024-10-18 DIAGNOSIS — M54.16 LUMBAR RADICULAR PAIN: ICD-10-CM

## 2024-10-18 DIAGNOSIS — M54.2 NECK PAIN: ICD-10-CM

## 2024-10-18 PROCEDURE — 3075F SYST BP GE 130 - 139MM HG: CPT | Performed by: OTHER

## 2024-10-18 PROCEDURE — 3079F DIAST BP 80-89 MM HG: CPT | Performed by: OTHER

## 2024-10-18 PROCEDURE — 99244 OFF/OP CNSLTJ NEW/EST MOD 40: CPT | Performed by: OTHER

## 2024-10-18 RX ORDER — IBUPROFEN 400 MG/1
400 TABLET, FILM COATED ORAL EVERY 8 HOURS
Qty: 40 TABLET | Refills: 0 | Status: SHIPPED | OUTPATIENT
Start: 2024-10-18 | End: 2024-10-31
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
## 2024-10-18 NOTE — PATIENT INSTRUCTIONS
Refill policies:    Allow 2-3 business days for refills; controlled substances may take longer.  Contact your pharmacy at least 5 days prior to running out of medication and have them send an electronic request or submit request through the “request refill” option in your [employer] account.  Refills are not addressed on weekends; covering physicians do not authorize routine medications on weekends.  No narcotics or controlled substances are refilled after noon on Fridays or by on call physicians.  By law, narcotics must be electronically prescribed.  A 30 day supply with no refills is the maximum allowed.  If your prescription is due for a refill, you may be due for a follow up appointment.  To best provide you care, patients receiving routine medications need to be seen at least once a year.  Patients receiving narcotic/controlled substance medications need to be seen at least once every 3 months.  In the event that your preferred pharmacy does not have the requested medication in stock (e.g. Backordered), it is your responsibility to find another pharmacy that has the requested medication available.  We will gladly send a new prescription to that pharmacy at your request.    Scheduling Tests:    If your physician has ordered radiology tests such as MRI or CT scans, please contact Central Scheduling at [phone] right away to schedule the test.  Once scheduled, the [facility] Centralized Referral Team will work with your insurance carrier to obtain pre-certification or prior authorization.  Depending on your insurance carrier, approval may take 3-10 days.  It is highly recommended patients assure they have received an authorization before having a test performed.  If test is done without insurance authorization, patient may be responsible for the entire amount billed.      Precertification and Prior Authorizations:  If your physician has recommended that you have a procedure or additional testing performed the [facility]  Centralized Referral Team will contact your insurance carrier to obtain pre-certification or prior authorization.    You are strongly encouraged to contact your insurance carrier to verify that your procedure/test has been approved and is a COVERED benefit.  Although the [facility] Centralized Referral Team does its due diligence, the insurance carrier gives the disclaimer that \"Although the procedure is authorized, this does not guarantee payment.\"    Ultimately the patient is responsible for payment.   Thank you for your understanding in this matter.  Paperwork Completion:  If you require FMLA or disability paperwork for your recovery, please make sure to either drop it off or have it faxed to our office at [phone]. Be sure the form has your name and date of birth on it.  The form will be faxed to our Forms Department and they will complete it for you.  There is a 25$ fee for all forms that need to be filled out.  Please be aware there is a 10-14 day turnaround time.  You will need to sign a release of information (EZEQUIEL) form if your paperwork does not come with one.  You may call the Forms Department with any questions at [phone].  Their fax number is [phone].

## 2024-10-21 ENCOUNTER — TELEPHONE (OUTPATIENT)
Dept: PHYSICAL THERAPY | Facility: HOSPITAL | Age: 49
End: 2024-10-21

## 2024-10-21 ENCOUNTER — OFFICE VISIT (OUTPATIENT)
Dept: ORTHOPEDICS CLINIC | Facility: CLINIC | Age: 49
End: 2024-10-21

## 2024-10-21 ENCOUNTER — HOSPITAL ENCOUNTER (OUTPATIENT)
Dept: GENERAL RADIOLOGY | Age: 49
Discharge: HOME | End: 2024-10-21
Attending: STUDENT IN AN ORGANIZED HEALTH CARE EDUCATION/TRAINING PROGRAM
Payer: COMMERCIAL

## 2024-10-21 DIAGNOSIS — T14.8XXA FRACTURE: Primary | ICD-10-CM

## 2024-10-21 DIAGNOSIS — T14.8XXA FRACTURE: ICD-10-CM

## 2024-10-21 DIAGNOSIS — M20.031 SWAN-NECK DEFORMITY OF FINGER OF RIGHT HAND: ICD-10-CM

## 2024-10-21 PROCEDURE — 99204 OFFICE O/P NEW MOD 45 MIN: CPT | Performed by: STUDENT IN AN ORGANIZED HEALTH CARE EDUCATION/TRAINING PROGRAM

## 2024-10-21 PROCEDURE — 73140 X-RAY EXAM OF FINGER(S): CPT | Performed by: STUDENT IN AN ORGANIZED HEALTH CARE EDUCATION/TRAINING PROGRAM

## 2024-10-22 ENCOUNTER — TELEPHONE (OUTPATIENT)
Dept: ORTHOPEDICS CLINIC | Facility: CLINIC | Age: 49
End: 2024-10-22

## 2024-10-22 ENCOUNTER — TELEPHONE (OUTPATIENT)
Dept: CARE COORDINATION | Age: 49
End: 2024-10-22

## 2024-10-22 ENCOUNTER — OFFICE VISIT (OUTPATIENT)
Dept: PHYSICAL THERAPY | Facility: HOSPITAL | Age: 49
End: 2024-10-22
Attending: Other
Payer: COMMERCIAL
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
## 2024-10-22 DIAGNOSIS — F07.81 POST CONCUSSION SYNDROME: ICD-10-CM

## 2024-10-22 PROCEDURE — 97163 PT EVAL HIGH COMPLEX 45 MIN: CPT

## 2024-10-22 NOTE — PATIENT INSTRUCTIONS
Exercise daily to tolerance. Go for walks, ride a stationary bike, chair yoga. Aim for 15-20 minutes a day if able.     Stop exercise if headache increases by 2 levels, you get nauseous or dizziness elevates to 5/10 in severity (strong).  Rest until symptoms improve and then either return to exercises or you can stop for the day.     Chair yoga on YouTube. Search for \"Yoga with Elzbieta BERNARDO\" and go to her channel.
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
## 2024-10-24 ENCOUNTER — APPOINTMENT (OUTPATIENT)
Dept: PHYSICAL THERAPY | Facility: HOSPITAL | Age: 49
End: 2024-10-24
Attending: Other
Payer: COMMERCIAL
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
## 2024-10-28 ENCOUNTER — APPOINTMENT (OUTPATIENT)
Dept: PHYSICAL THERAPY | Facility: HOSPITAL | Age: 49
End: 2024-10-28
Attending: Other
Payer: COMMERCIAL

## 2024-10-28 ENCOUNTER — OFFICE VISIT (OUTPATIENT)
Dept: SURGERY | Facility: CLINIC | Age: 49
End: 2024-10-28

## 2024-10-28 DIAGNOSIS — S62.636A CLOSED DISPLACED FRACTURE OF DISTAL PHALANX OF RIGHT LITTLE FINGER, INITIAL ENCOUNTER: Primary | ICD-10-CM

## 2024-10-28 PROCEDURE — 29130 APPL FINGER SPLINT STATIC: CPT | Performed by: OCCUPATIONAL THERAPIST
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
## 2024-10-29 ENCOUNTER — TELEPHONE (OUTPATIENT)
Dept: CARE COORDINATION | Age: 49
End: 2024-10-29

## 2024-10-29 DIAGNOSIS — S13.4XXA INJURY TO LIGAMENT OF CERVICAL SPINE, INITIAL ENCOUNTER: ICD-10-CM

## 2024-10-29 DIAGNOSIS — S62.620A CLOSED DISPLACED FRACTURE OF MIDDLE PHALANX OF RIGHT INDEX FINGER, INITIAL ENCOUNTER: ICD-10-CM

## 2024-10-30 ENCOUNTER — APPOINTMENT (OUTPATIENT)
Dept: PHYSICAL THERAPY | Facility: HOSPITAL | Age: 49
End: 2024-10-30
Attending: Other
Payer: COMMERCIAL

## 2024-11-01 RX ORDER — CYCLOBENZAPRINE HCL 10 MG
10 TABLET ORAL NIGHTLY PRN
Qty: 15 TABLET | Refills: 0 | Status: SHIPPED | OUTPATIENT
Start: 2024-11-01
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
## 2024-11-04 ENCOUNTER — TELEPHONE (OUTPATIENT)
Dept: FAMILY MEDICINE CLINIC | Facility: CLINIC | Age: 49
End: 2024-11-04

## 2024-11-04 DIAGNOSIS — S62.620A CLOSED DISPLACED FRACTURE OF MIDDLE PHALANX OF RIGHT INDEX FINGER, INITIAL ENCOUNTER: ICD-10-CM

## 2024-11-04 DIAGNOSIS — S13.4XXA INJURY TO LIGAMENT OF CERVICAL SPINE, INITIAL ENCOUNTER: Primary | ICD-10-CM
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
## 2024-11-05 ENCOUNTER — OFFICE VISIT (OUTPATIENT)
Dept: PHYSICAL THERAPY | Facility: HOSPITAL | Age: 49
End: 2024-11-05
Attending: Other
Payer: COMMERCIAL

## 2024-11-05 ENCOUNTER — APPOINTMENT (OUTPATIENT)
Dept: SURGERY | Facility: CLINIC | Age: 49
End: 2024-11-05

## 2024-11-05 DIAGNOSIS — S62.636A CLOSED DISPLACED FRACTURE OF DISTAL PHALANX OF RIGHT LITTLE FINGER, INITIAL ENCOUNTER: Primary | ICD-10-CM

## 2024-11-05 PROCEDURE — 97760 ORTHOTIC MGMT&TRAING 1ST ENC: CPT | Performed by: OCCUPATIONAL THERAPIST

## 2024-11-05 PROCEDURE — 95992 CANALITH REPOSITIONING PROC: CPT
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
## 2024-11-05 NOTE — PROGRESS NOTES
Diagnosis: post-concussion, headache, neck pain, dizziness      Visit (# authorized): 8 per POC ([employer] P[employer])                         Referring Provider: Elliott    Precautions: none    Pain: 8/10 headache; 3/10 neck pain    Subjective: Dizziness currently 6/10 \"foggy\". Reports going to therapy at a different clinic for legs and neck - finding it helpful but had to cxl her appointment last week after her initial eval last week here due to not feeling well. Has noted symptoms of dizziness with changes in position described as \"room spinning\" resulting in imbalance. She most recently experienced it yesterday when arising from lying position.     Objective:    Date  11/5          Visit Number  2          NMR           Ther EX           Ther Act           Gait Training           CRM  x          Manual             Additional Treatment Information:      Oculomotor exam:  Spontaneous Nystagmus: absent   Smooth Pursuit: Negative  Saccades: Negative  Gaze Evoked Nystagmus: Negative  Head Thrust: deferred 2/2 neck pain  VOR screen: symptomatic   VOR Cancellation: Negative   Convergence: Not Tested   Cover/Uncover: Negative   Cross Cover: Negative     Positional testing   [place]: R negative, asymptomatic  L: + for symptoms consistent in latency and duration to BPPV though no nystagmus was visualized. Tested x2 with same result. CRM performed x2 - a L torsional upbeat nystagmus was observed in third position only during first maneuver. Symptoms improved with second maneuver and no nystagmus was seen.         Patient Education:  POC and exam findings discussed with pt. BPPV etiology and rationale for tx explained. Aftercare instructions provided.       Assessment:   Pt with findings consistent with L posterior canal BPPV. CRM performed and tolerated well without adverse response. Verbalized understanding of material taught today. Ambulated out of clinic independently with good postural stability.     Plan: reassess, tx as  appropriate     Charges: 1 CRM       Total Timed Treatment: 34 min  Total Treatment Time: 34 min      21st Century Cures Act Notice to Patient: Medical documents like this are made available to patients in the interest of transparency. However, be advised this is a medical document and it is intended as worf-vu-gzne communication between your medical providers. This medical document may contain abbreviations, assessments, medical data, and results or other terms that are unfamiliar. Medical documents are intended to carry relevant information, facts as evident, and the clinical opinion of the practitioner. As such, this medical document may be written in language that appears blunt or direct. You are encouraged to contact your medical provider and/or [facility] Patient Experience if you have any questions about this medical document.

## 2024-11-06 NOTE — PROGRESS NOTES
Subjective: Wearing the splint has been OK.      Objective:     Current level of performance:  ADL: Independent  Work: 2 # lifting restriction with the right hand.  Leisure: Family    Measurements/Tests:  ROM:         N/A         Treatment Provided this day: Adjusted RSF extension splint per order.    Treatment Time: 20 minutes      Summary/Analysis of Treatment session: Patient has been compliant with the wear and care of RSF extension      Plan: To continue to be compliant with the wear and care of RSF extension splint x 3 weeks.      Follow up in:  Weekly splint adjustments.          Reji Ybarra  OTR/L

## 2024-11-07 RX ORDER — ACETAMINOPHEN AND CODEINE PHOSPHATE 300; 30 MG/1; MG/1
1 TABLET ORAL EVERY 6 HOURS PRN
Qty: 30 TABLET | Refills: 0 | Status: SHIPPED | OUTPATIENT
Start: 2024-11-07

## 2024-11-07 NOTE — TELEPHONE ENCOUNTER
Called patient and verified name and , informed her of medication Tylenol #3 being ordered, she stated she is continuing to follow up with ortho and PT.

## 2024-11-07 NOTE — TELEPHONE ENCOUNTER
Will give Tylenol-3 for now. But patient needs to follow up with ortho and PT. Will not give any more pain medications after this

## 2024-11-12 ENCOUNTER — OFFICE VISIT (OUTPATIENT)
Dept: PHYSICAL THERAPY | Facility: HOSPITAL | Age: 49
End: 2024-11-12
Attending: Other
Payer: COMMERCIAL

## 2024-11-12 PROCEDURE — 95992 CANALITH REPOSITIONING PROC: CPT

## 2024-11-12 NOTE — PROGRESS NOTES
Diagnosis: post-concussion, headache, neck pain, dizziness      Visit (# authorized): 8 per POC ([employer] P[employer])                         Referring Provider: Elliott    Precautions: none    Pain: 2/10 headache; 2/10 neck pain    Subjective: Lightheaded but not dizzy currently. Lightheadedness 1/10. She decided to stop therapy at the other clinic and will plan to pursue therapy through [facility]. Reports feeling as if the dizziness as well as her other symptoms have improved since she was last seen. Has not been exercising as she was waiting to be told it was ok by this author.     Objective:    Date  11/5 11/12         Visit Number  2 3         NMR           Ther EX           Ther Act           Gait Training           CRM  x x         Manual             Additional Treatment Information:      L: + for symptoms consistent in latency and duration to BPPV with very mild and brief L torsional upbeat nystagmus. CRM performed x2 -  L torsional upbeat nystagmus was observed all 3 positions during first maneuver. Symptoms improved with second maneuver and nystagmus was resolved.     Patient Education:  Aftercare instructions reviewed. Encouraged exercise to tolerance.        Assessment:   BPPV persists but improved. CRM performed and tolerated well without adverse response. Verbalized understanding of material taught today. Ambulated out of clinic independently with good postural stability.     Plan: reassess, tx as appropriate     Charges: 1 CRM       Total Timed Treatment: 20 min  Total Treatment Time: 20 min      21st Century Cures Act Notice to Patient: Medical documents like this are made available to patients in the interest of transparency. However, be advised this is a medical document and it is intended as tzvi-zy-nczo communication between your medical providers. This medical document may contain abbreviations, assessments, medical data, and results or other terms that are unfamiliar. Medical documents are intended to carry  relevant information, facts as evident, and the clinical opinion of the practitioner. As such, this medical document may be written in language that appears blunt or direct. You are encouraged to contact your medical provider and/or [facility] Patient Experience if you have any questions about this medical document.

## 2024-11-13 ENCOUNTER — OFFICE VISIT (OUTPATIENT)
Dept: SURGERY | Facility: CLINIC | Age: 49
End: 2024-11-13

## 2024-11-13 DIAGNOSIS — S62.636A CLOSED DISPLACED FRACTURE OF DISTAL PHALANX OF RIGHT LITTLE FINGER, INITIAL ENCOUNTER: Primary | ICD-10-CM

## 2024-11-13 PROCEDURE — 97760 ORTHOTIC MGMT&TRAING 1ST ENC: CPT | Performed by: OCCUPATIONAL THERAPIST

## 2024-11-13 NOTE — PROGRESS NOTES
Subjective: I have been wearing the splint as directed.      Objective:     Current level of performance:  ADL: Independent  Work: 2 # lifting restriction with the right hand.  Leisure: Family    Measurements/Tests:  ROM:         N/A         Treatment Provided this day: Adjusted RSF extension splint per order.    Treatment Time: 15 minutes      Summary/Analysis of Treatment session: Patient has been compliant with the wear and care of RSF extension splint x 2 weeks      Plan: To continue to be compliant with the wear and care of RSF extension splint x 2 weeks.      Follow up in:  Weekly splint adjustments.          Reji Ybarra  OTR/L

## 2024-11-19 ENCOUNTER — OFFICE VISIT (OUTPATIENT)
Dept: PHYSICAL THERAPY | Facility: HOSPITAL | Age: 49
End: 2024-11-19
Attending: Other
Payer: COMMERCIAL

## 2024-11-19 PROCEDURE — 95992 CANALITH REPOSITIONING PROC: CPT

## 2024-11-19 NOTE — PROGRESS NOTES
Diagnosis: post-concussion, headache, neck pain, dizziness      Visit (# authorized): 8 per POC ([employer] P[employer])                         Referring Provider: Elliott    Precautions: none    Pain: 0/10 headache; 1/10 neck pain    Subjective: Denies dizziness, feeling lightheaded 1/10. Reports she is no longer falling. She has only had 2 headaches in past week which she attributes to the noise in the environment. She reports she has been exercising which she tolerating fairly well with exception of low back pain.     Objective:    Date  11/5 11/12 11/19        Visit Number  2 3 4        NMR           Ther EX           Ther Act           Gait Training           CRM  x x x        Manual             Additional Treatment Information:      L: + for symptoms consistent in latency and duration to BPPV however no nystagmus was visualized. +R torsional downbeat nystagmus upon sitting up with +symptoms -   CRM therefore performed performed x2 -  Symptoms improved with second maneuver. No nystagmus seen throughout.     Patient Education:  POC discussed.       Assessment:   BPPV persists but continues to improve.     Plan: reassess, tx as appropriate     Charges: 1 CRM       Total Timed Treatment: 20 min  Total Treatment Time: 20 min      21st Century Cures Act Notice to Patient: Medical documents like this are made available to patients in the interest of transparency. However, be advised this is a medical document and it is intended as ggaa-me-tzwx communication between your medical providers. This medical document may contain abbreviations, assessments, medical data, and results or other terms that are unfamiliar. Medical documents are intended to carry relevant information, facts as evident, and the clinical opinion of the practitioner. As such, this medical document may be written in language that appears blunt or direct. You are encouraged to contact your medical provider and/or [facility] Patient Experience if you have  any questions about this medical document.

## 2024-11-20 ENCOUNTER — HOSPITAL ENCOUNTER (OUTPATIENT)
Dept: MAMMOGRAPHY | Age: 49
Discharge: HOME | End: 2024-11-20
Payer: COMMERCIAL

## 2024-11-20 DIAGNOSIS — Z12.31 ENCOUNTER FOR SCREENING MAMMOGRAM FOR MALIGNANT NEOPLASM OF BREAST: ICD-10-CM

## 2024-11-20 PROBLEM — M54.9 BACK PAIN DUE TO INJURY: Status: ACTIVE | Noted: 2024-11-20

## 2024-11-20 PROCEDURE — 77063 BREAST TOMOSYNTHESIS BI: CPT

## 2024-11-20 PROCEDURE — 77067 SCR MAMMO BI INCL CAD: CPT

## 2024-11-21 ENCOUNTER — OFFICE VISIT (OUTPATIENT)
Dept: PODIATRY CLINIC | Facility: CLINIC | Age: 49
End: 2024-11-21

## 2024-11-21 DIAGNOSIS — M76.71 PERONEUS BREVIS TENDINITIS, RIGHT: Primary | ICD-10-CM

## 2024-11-21 PROCEDURE — 99214 OFFICE O/P EST MOD 30 MIN: CPT | Performed by: PODIATRIST
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
## 2024-11-22 ENCOUNTER — TELEPHONE (OUTPATIENT)
Facility: CLINIC | Age: 49
End: 2024-11-22

## 2024-11-22 NOTE — TELEPHONE ENCOUNTER
Patient called in to schedule MRI follow up.    Patient stated the provider wanted to see her 48 hours after MRI.    Patient's MRI is scheduled for 11/27/2024.    Next available appointment is 12/18/2024.    Please advise if patient needs to be seen sooner.

## 2024-11-25 ENCOUNTER — OFFICE VISIT (OUTPATIENT)
Dept: NEUROLOGY | Facility: CLINIC | Age: 49
End: 2024-11-25
Payer: COMMERCIAL

## 2024-11-25 VITALS
SYSTOLIC BLOOD PRESSURE: 126 MMHG | RESPIRATION RATE: 16 BRPM | DIASTOLIC BLOOD PRESSURE: 74 MMHG | HEART RATE: 74 BPM | WEIGHT: 234 LBS | BODY MASS INDEX: 44 KG/M2

## 2024-11-25 DIAGNOSIS — F07.81 POST CONCUSSION SYNDROME: ICD-10-CM

## 2024-11-25 DIAGNOSIS — R42 VERTIGO: Primary | ICD-10-CM
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
## 2024-11-25 PROCEDURE — 99214 OFFICE O/P EST MOD 30 MIN: CPT | Performed by: OTHER

## 2024-11-25 PROCEDURE — 3074F SYST BP LT 130 MM HG: CPT | Performed by: OTHER

## 2024-11-25 PROCEDURE — 3078F DIAST BP <80 MM HG: CPT | Performed by: OTHER

## 2024-11-25 RX ORDER — MECLIZINE HYDROCHLORIDE 25 MG/1
25 TABLET ORAL 3 TIMES DAILY PRN
Qty: 20 TABLET | Refills: 2 | Status: SHIPPED | OUTPATIENT
Start: 2024-11-25
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
## 2024-11-25 NOTE — PATIENT INSTRUCTIONS
Refill policies:    Allow 2-3 business days for refills; controlled substances may take longer.  Contact your pharmacy at least 5 days prior to running out of medication and have them send an electronic request or submit request through the “request refill” option in your [employer] account.  Refills are not addressed on weekends; covering physicians do not authorize routine medications on weekends.  No narcotics or controlled substances are refilled after noon on Fridays or by on call physicians.  By law, narcotics must be electronically prescribed.  A 30 day supply with no refills is the maximum allowed.  If your prescription is due for a refill, you may be due for a follow up appointment.  To best provide you care, patients receiving routine medications need to be seen at least once a year.  Patients receiving narcotic/controlled substance medications need to be seen at least once every 3 months.  In the event that your preferred pharmacy does not have the requested medication in stock (e.g. Backordered), it is your responsibility to find another pharmacy that has the requested medication available.  We will gladly send a new prescription to that pharmacy at your request.    Scheduling Tests:    If your physician has ordered radiology tests such as MRI or CT scans, please contact Central Scheduling at [phone] right away to schedule the test.  Once scheduled, the [facility] Centralized Referral Team will work with your insurance carrier to obtain pre-certification or prior authorization.  Depending on your insurance carrier, approval may take 3-10 days.  It is highly recommended patients assure they have received an authorization before having a test performed.  If test is done without insurance authorization, patient may be responsible for the entire amount billed.      Precertification and Prior Authorizations:  If your physician has recommended that you have a procedure or additional testing performed the [facility]  Centralized Referral Team will contact your insurance carrier to obtain pre-certification or prior authorization.    You are strongly encouraged to contact your insurance carrier to verify that your procedure/test has been approved and is a COVERED benefit.  Although the [facility] Centralized Referral Team does its due diligence, the insurance carrier gives the disclaimer that \"Although the procedure is authorized, this does not guarantee payment.\"    Ultimately the patient is responsible for payment.   Thank you for your understanding in this matter.  Paperwork Completion:  If you require FMLA or disability paperwork for your recovery, please make sure to either drop it off or have it faxed to our office at [phone]. Be sure the form has your name and date of birth on it.  The form will be faxed to our Forms Department and they will complete it for you.  There is a 25$ fee for all forms that need to be filled out.  Please be aware there is a 10-14 day turnaround time.  You will need to sign a release of information (EZEQUIEL) form if your paperwork does not come with one.  You may call the Forms Department with any questions at [phone].  Their fax number is [phone].

## 2024-11-26 ENCOUNTER — OFFICE VISIT (OUTPATIENT)
Dept: PHYSICAL THERAPY | Facility: HOSPITAL | Age: 49
End: 2024-11-26
Attending: Other
Payer: COMMERCIAL

## 2024-11-26 PROCEDURE — 97530 THERAPEUTIC ACTIVITIES: CPT

## 2024-11-26 PROCEDURE — 97112 NEUROMUSCULAR REEDUCATION: CPT

## 2024-11-26 NOTE — PATIENT INSTRUCTIONS
Do these exercises 2-3 times a day    Sit in sturdy chair, feet on floor. Bend forward looking down, hold position for 5 seconds. Return to upright sitting. Do this 5-8 times. At your own pace.     Stand with bed or couch behind you for safety (do not lean against it).  Stand tall, hands on hips.  Turn head and shoulders to the right, looking over your shoulder.  Return to the center and repeat to the left.  Do 5 times each direction.    Stand with bed or couch behind you for safety (do not lean against it). Stand tall holding two cards about 12 inches apart at eye level arms length away.  Start with head and eyes on one target.  Turn eyes to the other target, then turn head to the target.  Alternate turning eyes and head to each target for about 30 seconds.  As you improve, try to turn eyes and head more rapidly, leading with your eyes        Stand with bed or couch behind you for safety (do not lean against it). Stand tall and place feet next to each other so that they are touching.  Cross arms, close your eyes.  Balance for 30 seconds, paying attention to the feeling of your feet on the floor.    Stand up tall with hands on wall and feet firmly on ground. Close eyes and pay attention to feeling of hands and feet on support surfaces. Push the wall away through your arms. With your eyes closed, lift one hand off the wall. Return hand to wall and repeat with the other arm. Alternate until you have performed 10 on each side. Eyes closed the entire time.

## 2024-11-26 NOTE — PROGRESS NOTES
Diagnosis: post-concussion, headache, neck pain, dizziness      Visit (# authorized): 8 per POC ([employer])                         Referring Provider: Elliott    Precautions: none    Pain: 0/10 headache; 0/10 neck pain    Subjective: Denies dizziness currently. Will experience dizziness with changes in position and with quick turns during ambulation. Feels as if sx's have greatly improved. Reports seeing neurologist for follow-up recently who order vestibular therapy 2/2 hx of vertigo- was advised to ask therapist about this per her report. She reports hx of falls - feels as if \"something is wrong\" not related to her positional vertigo or concussion. Reports episodes of \"blacking out\" and falling.     Objective:    Date  11/5 11/12 11/19 11/26       Visit Number  2 3 4 5       NMR    x       Ther EX           Ther Act    x       Gait Training           CRM  x x x        Manual             Additional Treatment Information:    Therapeutic Activity (25 mins):    Positional testing:   [place]: L negative for nystagmus- pt reports symptoms of vertigo that seem to fit temporal pattern of BPPV. No nystagmus upon sitting up though symptomatic   R: negative, asymptomatic   Head hanging: negative, asymptomatic   Roll test: negative, asymptomatic- reports symptoms upon sitting up from testing position -no nystagmus     L [place] repeated: negative for nystagmus and asymptomatic this time. Mild symptoms upon sitting up - no nystagmus     Pt educated on exam findings. Also discussed current therapy -though post-concussion is vestibular therapy. Explain that pt's sx's despite etiology are being addressed. Discussed fear avoidance and importance of movement. Explained role of habituation. Discussed systems utilized for balance and effects of over relying on vision for stability. Explained rationale for exercises for home and POC.     NMR (15 mins):   Seated - bending over 5 sec<>upright sitting, multiple reps (habituation)    Standing unsupported- pt selected JULIUS   Upper trunk rotations- head movement on body x5 B-demo with return demo    Gaze shifts horizontal plane - demo with return demo- 30 seconds   Romberg EC 30 seconds (postural sway WNL)   Hands on wall EC alt UE lifts x10 B -demo with return demo (grounding)       Patient Education:  As above. HEP initiated- see pt instructions      Assessment:   BPPV appears resolved but would benefit from recheck next session to ensure this was not a false negative. Pt demo's understanding of exercises provided for home. Performs safely in clinic without postural instability or LOB. Appears to exhibit fear avoidance behaviors and seems somewhat guarded to education at first.     Plan: reassess BPPV, tx as appropriate otherwise complete balance assessment      Charges: 2 TA, 1 NMR        Total Timed Treatment: 40 min  Total Treatment Time: 45 min      21st Century Cures Act Notice to Patient: Medical documents like this are made available to patients in the interest of transparency. However, be advised this is a medical document and it is intended as [id number] communication between your medical providers. This medical document may contain abbreviations, assessments, medical data, and results or other terms that are unfamiliar. Medical documents are intended to carry relevant information, facts as evident, and the clinical opinion of the practitioner. As such, this medical document may be written in language that appears blunt or direct. You are encouraged to contact your medical provider and/or [facility] Experience if you have any questions about this medical document.

## 2024-11-27 ENCOUNTER — HOSPITAL ENCOUNTER (OUTPATIENT)
Dept: MRI IMAGING | Age: 49
Discharge: HOME | End: 2024-11-27
Attending: PODIATRIST
Payer: COMMERCIAL

## 2024-11-27 DIAGNOSIS — M76.71 PERONEUS BREVIS TENDINITIS, RIGHT: ICD-10-CM

## 2024-11-27 PROCEDURE — 73718 MRI LOWER EXTREMITY W/O DYE: CPT | Performed by: PODIATRIST

## 2024-11-29 ENCOUNTER — TELEPHONE (OUTPATIENT)
Dept: PODIATRY CLINIC | Facility: CLINIC | Age: 49
End: 2024-11-29

## 2024-11-29 ENCOUNTER — TELEPHONE (OUTPATIENT)
Dept: PHYSICAL THERAPY | Facility: HOSPITAL | Age: 49
End: 2024-11-29

## 2024-11-29 NOTE — TELEPHONE ENCOUNTER
----- Message from Farrah Pickett sent at 11/29/2024 12:04 PM CST -----  Please call patient and inform her that her MRI shows no tear in her tendon on the outside of her ankle or foot just some plantar fasciitis and Achilles tendinitis.  Some physical therapy will help with the tendinitis however if her pain is nerve related I would recommend following up with her neurologist which she already has an established relationship with.

## 2024-11-29 NOTE — TELEPHONE ENCOUNTER
S/w patient- provider her with the results for this patient. She states that she is more concerned about the \"bone growth\" than the tendons in the foot. She was wondering if that needed to be shaved down. She has MRI tele-visit on Monday. I told her that the results can be discuss in more detail at that time. She was agreeable to plan.    GROVER

## 2024-12-02 ENCOUNTER — OFFICE VISIT (OUTPATIENT)
Dept: SURGERY | Facility: CLINIC | Age: 49
End: 2024-12-02

## 2024-12-02 ENCOUNTER — VIRTUAL PHONE E/M (OUTPATIENT)
Dept: PODIATRY CLINIC | Facility: CLINIC | Age: 49
End: 2024-12-02

## 2024-12-02 DIAGNOSIS — S62.636A CLOSED DISPLACED FRACTURE OF DISTAL PHALANX OF RIGHT LITTLE FINGER, INITIAL ENCOUNTER: Primary | ICD-10-CM

## 2024-12-02 DIAGNOSIS — M76.62 ACHILLES TENDONITIS, BILATERAL: Primary | ICD-10-CM

## 2024-12-02 DIAGNOSIS — M25.641 JOINT STIFFNESS OF HAND, RIGHT: ICD-10-CM

## 2024-12-02 DIAGNOSIS — M76.61 ACHILLES TENDONITIS, BILATERAL: Primary | ICD-10-CM

## 2024-12-02 DIAGNOSIS — M62.81 DISTAL MUSCLE WEAKNESS: Primary | ICD-10-CM

## 2024-12-02 PROCEDURE — 97165 OT EVAL LOW COMPLEX 30 MIN: CPT | Performed by: OCCUPATIONAL THERAPIST

## 2024-12-02 PROCEDURE — 97110 THERAPEUTIC EXERCISES: CPT | Performed by: OCCUPATIONAL THERAPIST
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
## 2024-12-02 NOTE — PROGRESS NOTES
----- Message from JASE Stinson sent at 1/2/2024  2:12 PM EST -----  ECHO completed with EF of 60%.  Plan to proceed with ASV titration study.  Testing ordered. Patient to be scheduled for study with plan for set up of equipment after study and compliance follow up 31-91 days after set up.   Subjective: I took the splint of x 4 days ago.      Objective:     Current level of performance:  ADL: Independent  Work: 2 # lifting restriction with the right hand.  Leisure: Family.    Measurements/Tests:  ROM:         N/A         Treatment Provided this day: Initiated AROM of the RSF this day:  AROM:   X 20 reps per set, x 5 sets daily:    Tendon glides:  X 10 reps per set, x 8 sets daily:  Physician follow-up.    Treatment Time: ***      Summary/Analysis of Treatment session: ***      Plan: ***      Follow up in:  ***          Reji Ybarra  OTR/L
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
## 2024-12-04 ENCOUNTER — APPOINTMENT (OUTPATIENT)
Dept: PHYSICAL THERAPY | Facility: HOSPITAL | Age: 49
End: 2024-12-04
Attending: Other

## 2024-12-09 ENCOUNTER — OFFICE VISIT (OUTPATIENT)
Dept: SURGERY | Facility: CLINIC | Age: 49
End: 2024-12-09
Payer: COMMERCIAL

## 2024-12-09 VITALS
HEART RATE: 84 BPM | HEIGHT: 61 IN | DIASTOLIC BLOOD PRESSURE: 64 MMHG | SYSTOLIC BLOOD PRESSURE: 118 MMHG | WEIGHT: 233.63 LBS | OXYGEN SATURATION: 99 % | BODY MASS INDEX: 44.11 KG/M2

## 2024-12-09 DIAGNOSIS — F43.9 STRESS: ICD-10-CM

## 2024-12-09 DIAGNOSIS — E66.01 MORBID OBESITY WITH BMI OF 40.0-44.9, ADULT (HCC): ICD-10-CM

## 2024-12-09 DIAGNOSIS — Z98.84 S/P GASTRIC BYPASS: ICD-10-CM

## 2024-12-09 DIAGNOSIS — E44.1 MILD PROTEIN-CALORIE MALNUTRITION (HCC): Primary | ICD-10-CM

## 2024-12-09 DIAGNOSIS — E53.8 LOW SERUM VITAMIN B12: ICD-10-CM

## 2024-12-09 RX ORDER — CYANOCOBALAMIN 1000 UG/ML
1000 INJECTION, SOLUTION INTRAMUSCULAR; SUBCUTANEOUS ONCE
Status: COMPLETED | OUTPATIENT
Start: 2024-12-09 | End: 2024-12-09

## 2024-12-09 RX ORDER — PHENTERMINE HYDROCHLORIDE 15 MG/1
15 CAPSULE ORAL EVERY MORNING
Qty: 30 CAPSULE | Refills: 5 | Status: SHIPPED | OUTPATIENT
Start: 2024-12-09

## 2024-12-09 RX ADMIN — CYANOCOBALAMIN 1000 MCG: 1000 INJECTION, SOLUTION INTRAMUSCULAR; SUBCUTANEOUS at 16:05:00
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
## 2024-12-10 ENCOUNTER — APPOINTMENT (OUTPATIENT)
Dept: PHYSICAL THERAPY | Facility: HOSPITAL | Age: 49
End: 2024-12-10
Attending: Other

## 2024-12-17 ENCOUNTER — APPOINTMENT (OUTPATIENT)
Dept: PHYSICAL THERAPY | Facility: HOSPITAL | Age: 49
End: 2024-12-17
Attending: Other

## 2024-12-18 ENCOUNTER — APPOINTMENT (OUTPATIENT)
Dept: SURGERY | Facility: CLINIC | Age: 49
End: 2024-12-18

## 2024-12-18 DIAGNOSIS — M25.641 JOINT STIFFNESS OF HAND, RIGHT: ICD-10-CM

## 2024-12-18 DIAGNOSIS — M62.81 DISTAL MUSCLE WEAKNESS: Primary | ICD-10-CM

## 2024-12-18 PROCEDURE — 97022 WHIRLPOOL THERAPY: CPT | Performed by: OCCUPATIONAL THERAPIST

## 2024-12-18 PROCEDURE — 97110 THERAPEUTIC EXERCISES: CPT | Performed by: OCCUPATIONAL THERAPIST

## 2024-12-19 NOTE — PROGRESS NOTES
Subjective: I am doing my exercises.      Objective:     Current level of performance:  ADL: Independent  Work: 2 # lifting restriction  Leisure: Family    Measurements/Tests:  ROM:  Testing By: rosie  MP Small Right: 80  PIP Small Right: 80  DIP Small Right: 30  Edema Small Right: 190 POWERS            Treatment Provided this day: Fluidotherapy to the right wrist and hand: To increase active range of motion, reduce joint stiffness, as well as decrease scar sensitivity, for increased functional use of the involved extremity, during self care, work and or leisure time tasks. Therapeutic exercise:    AROM:   X 20 reps per set, x 5 sets daily:    Tendon glides:  X 10 reps per set, x 8 sets daily:    PROM:  To all digits:  X 15 reps each digit per set, x 5 sets daily    HEP:    Treatment Time: 30 minutes      Summary/Analysis of Treatment session: Progressing well with OT goals and objectives.      Plan: Full use of the right hand in x 3 weeks.      Follow up in:  x 1 week.          Reji Ybarra  OTR/ALETHA

## 2025-01-02 ENCOUNTER — OFFICE VISIT (OUTPATIENT)
Dept: SURGERY | Facility: CLINIC | Age: 50
End: 2025-01-02

## 2025-01-02 ENCOUNTER — TELEPHONE (OUTPATIENT)
Dept: PHYSICAL THERAPY | Facility: HOSPITAL | Age: 50
End: 2025-01-02

## 2025-01-02 DIAGNOSIS — M25.641 JOINT STIFFNESS OF HAND, RIGHT: ICD-10-CM

## 2025-01-02 DIAGNOSIS — M62.81 DISTAL MUSCLE WEAKNESS: Primary | ICD-10-CM

## 2025-01-02 DIAGNOSIS — S62.636A CLOSED DISPLACED FRACTURE OF DISTAL PHALANX OF RIGHT LITTLE FINGER, INITIAL ENCOUNTER: Primary | ICD-10-CM

## 2025-01-02 PROCEDURE — 97110 THERAPEUTIC EXERCISES: CPT | Performed by: OCCUPATIONAL THERAPIST

## 2025-01-02 PROCEDURE — 99214 OFFICE O/P EST MOD 30 MIN: CPT | Performed by: PLASTIC SURGERY

## 2025-01-02 RX ORDER — MULTIVIT-MIN/IRON FUM/FOLIC AC 7.5 MG-4
1 TABLET ORAL DAILY
COMMUNITY

## 2025-01-02 NOTE — PROGRESS NOTES
Subjective: I do not have any pain.      Objective:     Current level of performance:  ADL: Independent  Work: Realtor  Leisure: Family    Measurements/Tests:  ROM:  Testing By: rosie  MP Small Right: 100  PIP Small Right: 50  DIP Small Right: -30 / 66  Edema Small Right: 186 POWERS   Strength Right: 30 #      Strength Left: 35 #      Treatment Provided this day: Up dated RSF objective information: Reviewed HEP:  AROM:   X 20 reps per set, x 5 sets daily:    Tendon glides:  X 10 reps per set, x 8 sets daily:    PROM:  To all digits:  X 15 reps each digit per set, x 5 sets daily:    Physician follow-up.    Treatment Time: 20 minutes      Summary/Analysis of Treatment session: Progressing with OT goals and objectives.      Plan: Full use of the right hand in x 2 weeks.      Follow up in:  x 1 week.          Reji Ybarra  OTR/L
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
## 2025-01-06 ENCOUNTER — APPOINTMENT (OUTPATIENT)
Dept: PHYSICAL THERAPY | Facility: HOSPITAL | Age: 50
End: 2025-01-06
Attending: Other

## 2025-01-15 ENCOUNTER — APPOINTMENT (OUTPATIENT)
Dept: PHYSICAL THERAPY | Facility: HOSPITAL | Age: 50
End: 2025-01-15
Attending: Other

## 2025-01-16 ENCOUNTER — APPOINTMENT (OUTPATIENT)
Dept: SURGERY | Facility: CLINIC | Age: 50
End: 2025-01-16

## 2025-01-16 DIAGNOSIS — M25.641 JOINT STIFFNESS OF HAND, RIGHT: ICD-10-CM

## 2025-01-16 DIAGNOSIS — M62.81 DISTAL MUSCLE WEAKNESS: Primary | ICD-10-CM

## 2025-01-16 PROCEDURE — 97110 THERAPEUTIC EXERCISES: CPT | Performed by: OCCUPATIONAL THERAPIST

## 2025-01-16 PROCEDURE — 97022 WHIRLPOOL THERAPY: CPT | Performed by: OCCUPATIONAL THERAPIST

## 2025-01-22 ENCOUNTER — APPOINTMENT (OUTPATIENT)
Dept: PHYSICAL THERAPY | Facility: HOSPITAL | Age: 50
End: 2025-01-22
Attending: Other

## 2025-01-29 ENCOUNTER — APPOINTMENT (OUTPATIENT)
Dept: PHYSICAL THERAPY | Facility: HOSPITAL | Age: 50
End: 2025-01-29
Attending: Other

## 2025-02-05 ENCOUNTER — APPOINTMENT (OUTPATIENT)
Dept: PHYSICAL THERAPY | Facility: HOSPITAL | Age: 50
End: 2025-02-05
Attending: Other

## 2025-03-06 RX ORDER — ERGOCALCIFEROL 1.25 MG/1
50000 CAPSULE, LIQUID FILLED ORAL WEEKLY
Qty: 12 CAPSULE | Refills: 0 | Status: SHIPPED | OUTPATIENT
Start: 2025-03-06

## 2025-05-07 DIAGNOSIS — M54.9 BACK PAIN DUE TO INJURY: ICD-10-CM

## 2025-05-07 DIAGNOSIS — M54.2 NECK PAIN: ICD-10-CM

## 2025-05-07 DIAGNOSIS — M54.16 LUMBAR RADICULAR PAIN: ICD-10-CM

## 2025-05-08 RX ORDER — CYCLOBENZAPRINE HCL 10 MG
10 TABLET ORAL NIGHTLY PRN
Qty: 15 TABLET | Refills: 0 | Status: SHIPPED | OUTPATIENT
Start: 2025-05-08

## 2025-06-02 RX ORDER — ERGOCALCIFEROL 1.25 MG/1
50000 CAPSULE, LIQUID FILLED ORAL WEEKLY
Qty: 12 CAPSULE | Refills: 0 | Status: SHIPPED | OUTPATIENT
Start: 2025-06-02

## (undated) DIAGNOSIS — E66.9 OBESITY (BMI 30-39.9): ICD-10-CM

## (undated) DIAGNOSIS — Z98.84 S/P GASTRIC BYPASS: Primary | ICD-10-CM

## (undated) DEVICE — VIOLET BRAIDED (POLYGLACTIN 910), SYNTHETIC ABSORBABLE SUTURE: Brand: COATED VICRYL

## (undated) DEVICE — SOL  .9 1000ML BTL

## (undated) DEVICE — SUTURE PDS II 1 CT-1

## (undated) DEVICE — SOL H2O 1000ML BTL

## (undated) DEVICE — SUTURE PASSOR WITH GUIDE

## (undated) DEVICE — Device: Brand: JELCO

## (undated) DEVICE — MEDI-VAC NON-CONDUCTIVE SUCTION TUBING: Brand: CARDINAL HEALTH

## (undated) DEVICE — HOVERMATT 34IN SINGLE USE

## (undated) DEVICE — TROCAR: Brand: KII FIOS FIRST ENTRY

## (undated) DEVICE — DERMABOND LIQUID ADHESIVE

## (undated) DEVICE — TROCARS: Brand: KII® BALLOON BLUNT TIP SYSTEM

## (undated) DEVICE — 3M™ STERI-DRAPE™ INSTRUMENT POUCH 1018L: Brand: STERI-DRAPE™

## (undated) DEVICE — ENCORE® LATEX MICRO SIZE 7, STERILE LATEX POWDER-FREE SURGICAL GLOVE: Brand: ENCORE

## (undated) DEVICE — PROXIMATE SKIN STAPLERS (35 WIDE) CONTAINS 35 STAINLESS STEEL STAPLES (FIXED HEAD): Brand: PROXIMATE

## (undated) DEVICE — DISSECTOR SONICISION CORDLESS

## (undated) DEVICE — KENDALL SCD EXPRESS SLEEVES, KNEE LENGTH, MEDIUM: Brand: KENDALL SCD

## (undated) DEVICE — DEVICE FIX.SECURESTRAP 5MM

## (undated) DEVICE — ENDOPATH ECHELON ENDOSCOPIC LINEAR CUTTER RELOADS, BLUE, 60MM: Brand: ECHELON ENDOPATH

## (undated) DEVICE — LAP CHOLE: Brand: MEDLINE INDUSTRIES, INC.

## (undated) DEVICE — SUTURE PDS II 0 CT-1

## (undated) DEVICE — [HIGH FLOW INSUFFLATOR,  DO NOT USE IF PACKAGE IS DAMAGED,  KEEP DRY,  KEEP AWAY FROM SUNLIGHT,  PROTECT FROM HEAT AND RADIOACTIVE SOURCES.]: Brand: PNEUMOSURE

## (undated) DEVICE — ORVIL DEVICE 25MM

## (undated) DEVICE — ENCORE® LATEX MICRO SIZE 7.5, STERILE LATEX POWDER-FREE SURGICAL GLOVE: Brand: ENCORE

## (undated) DEVICE — ENDOSTITCH SURGIDAC 0

## (undated) DEVICE — UNDYED BRAIDED (POLYGLACTIN 910), SYNTHETIC ABSORBABLE SUTURE: Brand: COATED VICRYL

## (undated) DEVICE — SUTURE MONOCRYL 4-0 Y935H

## (undated) DEVICE — SUTURE SILK 2-0 SH

## (undated) DEVICE — SPINOCAN® 22 GA. X 3-1/2 IN. (90 MM) SPINAL NEEDLE: Brand: SPINOCAN®

## (undated) DEVICE — ENDOSCOPY PORT CONNECTOR FOR OLYMPUS® SCOPES: Brand: ERBE

## (undated) DEVICE — SOL  .9 1000ML BAG

## (undated) DEVICE — ENDOPATH ECHELON ENDOSCOPIC LINEAR CUTTER RELOADS, WHITE, 60MM: Brand: ECHELON ENDOPATH

## (undated) DEVICE — STERILE SURGICAL LUBRICANT, METAL TUBE: Brand: SURGILUBE

## (undated) DEVICE — ECHELON FLEX POWERED PLUS ARTICULATING ENDOSCOPIC LINEAR CUTTER , 60MM: Brand: ECHELON FLEX

## (undated) DEVICE — HYBRID TUBING/CAP SET FOR OLYMPUS® SCOPES: Brand: ERBE

## (undated) DEVICE — TROCAR: Brand: KII® SLEEVE

## (undated) DEVICE — DEVICE PORT SITE CLOSURE

## (undated) DEVICE — STAPLER EEA XL 25MM

## (undated) DEVICE — TISSUE RETRIEVAL SYSTEM: Brand: INZII RETRIEVAL SYSTEM

## (undated) DEVICE — SUTURING DEVICE: Brand: ENDO STITCH

## (undated) DEVICE — Device

## (undated) DEVICE — DRAPE SHEET LG

## (undated) DEVICE — ECHELON FLEX POWERED PLUS LONG ARTICULATING ENDOSCOPIC LINEAR CUTTER, 60MM: Brand: ECHELON FLEX

## (undated) NOTE — MR AVS SNAPSHOT
[facility] [place] [id number] [phone]               Thank you for choosing us for your health care visit with Gisela Davies MD.  We are glad to serve you and happy to provide you with this summary of your vi [phone]      Today's Orders     Ferritin   Complete by:    Oct 17, 2018 (Approximate)      Assoc Dx:  Encounter for therapeutic drug monitoring [Z51.81], Morbid obesity with BMI of 60.0-69.9, adult ([facility] [E66.01, Z68.44], Essential hypertension [I10] Instructions and Information about Your Health    None     Allergies as of Oct 17, 2018     Radiology Contrast Iodinated Dyes HIVES, ITCHING                Today's Vital Signs     BP   176/92      Pulse   70    Height   61\"    Weight   326 lb 12.8 oz (148

## (undated) NOTE — LETTER
7/22/2019          To Whom It May Concern:    lincoln Kits is currently under my medical care and may not return to work at this time. Please excuse Kristine Callaway for 3 months. She may return to work on July 30th, 2019.   Activity is restricted as follows:

## (undated) NOTE — LETTER
Patient Name: Jonny Waters        : 1975       Medical Record #: [id number]    CONSENT FOR PROCEDURES    Date: 2018       Time: 12:41 PM        1.  I authorize the performance upon Jonny Waters the following:

## (undated) NOTE — LETTER
8/28/2018              Jay Villaseñor        [address] [address]         Dear Sanju Gamez,      It was a pleasure to see you at our [place] [address] [place] [address] office.   Your stress test was normal.  There is no need for

## (undated) NOTE — LETTER
10/21/2019          To Whom It May Concern:    Lashonda Arguello is currently under my medical care and may not return to work at this time. Please excuse Majo Vail for 3 days. She may return to work on Friday October 21st, 2019.   Activity is restricted as

## (undated) NOTE — MR AVS SNAPSHOT
[facility] [place] [address] [place]  [id number] [phone]               Thank you for choosing us for your health care visit with Sumi Chow MD.  We are glad to serve you and happy to provide you with this summary o Don’t eat while distracted and slow down. Avoid over sized portions. Don’t eat while when you’re bored.      EAT THESE FOODS MORE OFTEN: EAT THESE FOODS LESS OFTEN:   Make half your plate fruits and vegetables Highly refined, white starches including wh

## (undated) NOTE — LETTER
AUTHORIZATION FOR SURGICAL OPERATION OR OTHER PROCEDURE    1.  I hereby [facility] Dr. Samuel Bradshaw, [facility] assigned to my case to perform the following operation and/or procedure at the [facility]:    ___________________________A Time:  ________ A. M.  P.M.        Patient Name:  ______________________________________________________  (please print)      Patient signature:  ___________________________________________________             Relationship to Patient:

## (undated) NOTE — LETTER
9/27/2018          To Whom It May Concern:    Tiffany Patel is currently under my medical care and may not return to work at this time. Please excuse Gill Rosenberg for 1 days. She may return to work on September 26, 2018. (Excuse for September 25, 2018).   A

## (undated) NOTE — LETTER
8/3/2018              Jim Hernandez        [address] [address]         Dear Diaz,      Sincerely,    Eugenie Ordaz MD  [address] [address]  [phone]

## (undated) NOTE — LETTER
August 9, 2018     [address] [facility]      Dear Rudolph Brooks:    Below are the results from your recent visit:    Resulted Orders   HEPATIC FUNCTION PANEL (7)   Result Value Ref Range    AST 88 (H) 15 - 41 U/L    ALT 67 (H) 14

## (undated) NOTE — LETTER
9/13/2018              Jonny Waters        [address] [place] [address]         To Whom It May Concern,    Alexei Dumont may return to work on Monday, September 17, 2018.  Please excuse any missed work from September 10 through September

## (undated) NOTE — LETTER
2/6/2018              Cedric Anderson        [address] [place] [address]         To Whom It May Concern,    Alysa Ochoa may return to work February 7, 2018.          Sincerely,    MD Sorin Marte [place] , [facility]

## (undated) NOTE — LETTER
11/20/2019          To Whom It May Concern:    Bernell Bumpers is currently under my medical care and may not return to work at this time. Please excuse Louie Vasquez for 8 days. She may return to work on Thursday November 21st, 2019.   Activity is restricted

## (undated) NOTE — Clinical Note
She has agreed to proceed with the gastric bypass. I will have her follow up with you. She will need pulmonary and cardiac clearance. Facundo Chilel

## (undated) NOTE — LETTER
7/31/2019                 To Whom It May Concern,    Olive Melvin is under my medical care. She needs to wear a head cover at work in order to cover the bald spots in her head which are still growing in after her multiple surgeries.  She has a nasrin

## (undated) NOTE — ED AVS SNAPSHOT
Jim Hernandez   MRN: [id number]    Department:  [facility] Emergency Department   Date of Visit:  10/31/2018           Disclosure     Insurance plans vary and the physician(s) referred by the ER may not be covered by your plan.  Please contact CARE PHYSICIAN AT ONCE OR RETURN IMMEDIATELY TO THE EMERGENCY DEPARTMENT. If you have been prescribed any medication(s), please fill your prescription right away and begin taking the medication(s) as directed.   If you believe that any of the medications

## (undated) NOTE — LETTER
11/14/2018          To Whom It May Concern:    Lisa Medina is currently under my medical care and may not return to work at this time. She may return to work on 11/19/18. If you require additional information please contact our office.         Francisco De Souza

## (undated) NOTE — LETTER
8/29/2018          To Whom It May Concern:    Olive Melvin is currently under my medical care and may not return to work at this time. She may return to work on 9/6/18. Activity is restricted as follows: none.     If you require additional informatio

## (undated) NOTE — LETTER
11/21/2018              Mercy Victor        [address] [place] [address]         To Whom it may concern:    Volodymyrjany BarbourVictor was seen today in cardiology clinic for preop evaluation for the upcoming bariatric weight loss surgery.     Patient

## (undated) NOTE — Clinical Note
3/29/2017          To Whom It May Concern:    Eliud Mak is currently under my medical care and may not return to work at this time. Please excuse Kirill Mike for 4 days 3/27/17-3/30/17. She may return to work on 3/31/17.   Activity is restricted as follo

## (undated) NOTE — ED AVS SNAPSHOT
Olive Melvin   MRN: [id number]    Department:  [facility] Emergency Department   Date of Visit:  8/28/2018           Disclosure     Insurance plans vary and the physician(s) referred by the ER may not be covered by your plan.  Please contact CARE PHYSICIAN AT ONCE OR RETURN IMMEDIATELY TO THE EMERGENCY DEPARTMENT. If you have been prescribed any medication(s), please fill your prescription right away and begin taking the medication(s) as directed.   If you believe that any of the medications

## (undated) NOTE — LETTER
4/4/2018          To Whom It May Concern:    Wendy Leger is currently under my medical care and may not return to work at this time. She may return to work on 4/18/18. Activity is restricted as follows: none.     If you require additional informatio

## (undated) NOTE — LETTER
2/26/2018          To Whom It May Concern:    Rachael Kay is currently under my medical care and may not return to work at this time. She may not return until next evaluation in 1 week.       If you require additional information please contact our o

## (undated) NOTE — LETTER
8/3/2018          To Whom It May Concern:    Sunshine Forrest is currently under my medical care and may not return to work at this time. Please excuse Ye Lofton for {NUMBERS 0-10:3282} {days weeks:3323::\"days\"}.   {HE/SHE :6250} may return to {em school/w

## (undated) NOTE — MR AVS SNAPSHOT
[facility] [place] [address] [address] [address]  [phone]               Thank you for choosing us for your health care visit with Mavis Lee MD.  We are glad to serve you and happy to provide you with this summary of y Visit [email or web address] to learn more.            Visit [facility] online at  [email or web address]

## (undated) NOTE — LETTER
8/3/2018              Lashonda [facility]        [address] [place] [id number]         To Whom It May Concern:    Please excuse Deborra  from June 28, 2018 through August 7, 2018 as she was under my medical care.  Please feel free to contac

## (undated) NOTE — LETTER
June 1, 2018     [address] [facility] [address]      Dear Yosvany Perez:    Below are the results from your recent visit:    Resulted Orders   COMP METABOLIC PANEL (14)   Result Value Ref Range    Glucose 145 (H) 70 - 99 mg/dL    Sodium 13  140 - 400 K/UL    MPV 10.2 7.4 - 10.3 fL    Neutrophil % 63 %    Lymphocyte % 29 %    Monocyte % 5 %    Eosinophil % 3 %    Basophil % 0 %    Neutrophil Absolute 4.7 1.8 - 7.7 K/UL    Lymphocyte Absolute 2.1 1.0 - 4.0 K/UL    Monocyte Absolute 0.4

## (undated) NOTE — LETTER
5/31/2018          To Whom It May Concern:    Jim Hernandez is currently under my medical care and may not return to work at this time. Please excuse Rudolph Brooks for 6 days. She may return to work on June 5, 2018. Activity is restricted as follows: none.

## (undated) NOTE — LETTER
9/23/2019          To Whom It May Concern:    Jay Villaseñor is currently under my medical care and may not return to work at this time. Please excuse Sanju Gamez for 7 days. She may return to work on Wednesday September 24th, 2019.   Activity is restricted
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
## (undated) NOTE — LETTER
3/5/2019              [address] [place] [address]         To Whom It May Concern,     Please excuse Marie Sushila from work until Monday March 11, 2019 as she is under my medical care.  Please contact my office with

## (undated) NOTE — LETTER
AUTHORIZATION FOR SURGICAL OPERATION OR OTHER PROCEDURE    1.  I hereby authorize Dr. Compa Garzon, and [address] [place] [address] staff assigned to my case to perform the following operation and/or procedure at the [address] [place] [address]:    ____________________________ Time:  ________ A. M.  P.M.        Patient Name:  ______________________________________________________  (please print)      Patient signature:  ___________________________________________________             Relationship to Patient:

## (undated) NOTE — LETTER
4/4/2018          To Whom It May Concern:    Coletta Najjar is currently under my medical care and may not return to work at this time. Please excuse Genny Hunter for 2 weeks. She may return to work on 4/18/18. Activity is restricted as follows: none.     I

## (undated) NOTE — LETTER
5/7/2019          To Whom It May Concern:    Mallory Goel is currently under my medical care and may not return to work at this time. Please excuse Prosper Islas for 2 days. She may return to work on May 9th, 2019.   Activity is restricted as follows: light

## (undated) NOTE — MR AVS SNAPSHOT
[facility] [place] [address] [address] [address] [phone]               Thank you for choosing us for your health care visit with JUNIOR Browne.   We are glad to serve you and happy to provide you with this summary of